# Patient Record
Sex: FEMALE | Race: BLACK OR AFRICAN AMERICAN | NOT HISPANIC OR LATINO | Employment: FULL TIME | URBAN - METROPOLITAN AREA
[De-identification: names, ages, dates, MRNs, and addresses within clinical notes are randomized per-mention and may not be internally consistent; named-entity substitution may affect disease eponyms.]

---

## 2017-11-10 ENCOUNTER — HOSPITAL ENCOUNTER (OUTPATIENT)
Dept: LAB | Facility: HOSPITAL | Age: 22
Discharge: HOME | End: 2017-11-10
Attending: PHYSICIAN ASSISTANT | Admitting: PHYSICIAN ASSISTANT

## 2017-11-10 LAB
ALBUMIN SERPL-MCNC: 4 G/DL (ref 3.5–4.8)
ALBUMIN/GLOB SERPL: 1.4 {RATIO} (ref 1–1.7)
ALP SERPL-CCNC: 77 IU/L (ref 32–91)
ALT SERPL-CCNC: 24 IU/L (ref 14–54)
ANION GAP SERPL CALC-SCNC: 10 MMOL/L (ref 10–20)
AST SERPL-CCNC: 20 IU/L (ref 15–41)
BILIRUB SERPL-MCNC: 0.4 MG/DL (ref 0.3–1.2)
BUN SERPL-MCNC: 11 MG/DL (ref 8–20)
BUN/CREAT SERPL: 13.8 (ref 5.4–26.2)
CALCIUM SERPL-MCNC: 9.2 MG/DL (ref 8.9–10.3)
CHLORIDE SERPL-SCNC: 105 MMOL/L (ref 101–111)
CHOLEST SERPL-MCNC: 147 MG/DL
CHOLEST/HDLC SERPL: 3.9 {RATIO}
CONV CO2: 24 MMOL/L (ref 22–32)
CONV LDL CHOLESTEROL DIRECT: 103 MG/DL (ref 0–100)
CONV TOTAL PROTEIN: 6.8 G/DL (ref 6.1–7.9)
CREAT UR-MCNC: 0.8 MG/DL (ref 0.4–1)
FSH SERPL-ACNC: NORMAL MIU/ML
GLOBULIN UR ELPH-MCNC: 2.8 G/DL (ref 2.5–3.8)
GLUCOSE SERPL-MCNC: 98 MG/DL (ref 65–99)
HDLC SERPL-MCNC: 38 MG/DL
INSULIN SERPL-ACNC: 19.5 UIU/ML (ref 2–23)
LDLC/HDLC SERPL: 2.7 {RATIO}
LH SERPL-ACNC: NORMAL MIU/ML
LIPID INTERPRETATION: ABNORMAL
POTASSIUM SERPL-SCNC: 4 MMOL/L (ref 3.6–5.1)
SODIUM SERPL-SCNC: 135 MMOL/L (ref 136–144)
TRIGL SERPL-MCNC: 93 MG/DL
TSH SERPL-ACNC: 1.94 UIU/ML (ref 0.34–5.6)
VLDLC SERPL CALC-MCNC: 6.1 MG/DL

## 2018-09-28 ENCOUNTER — HOSPITAL ENCOUNTER (OUTPATIENT)
Dept: LAB | Facility: HOSPITAL | Age: 23
Discharge: HOME | End: 2018-09-28
Attending: OBSTETRICS & GYNECOLOGY | Admitting: OBSTETRICS & GYNECOLOGY

## 2020-02-29 ENCOUNTER — HOSPITAL ENCOUNTER (INPATIENT)
Facility: HOSPITAL | Age: 25
LOS: 1 days | Discharge: HOME | End: 2020-03-03
Attending: EMERGENCY MEDICINE | Admitting: INTERNAL MEDICINE

## 2020-02-29 DIAGNOSIS — D72.829 LEUKOCYTOSIS, UNSPECIFIED TYPE: ICD-10-CM

## 2020-02-29 DIAGNOSIS — K56.7 ILEUS (HCC): ICD-10-CM

## 2020-02-29 DIAGNOSIS — R10.9 ABDOMINAL PAIN, UNSPECIFIED ABDOMINAL LOCATION: Primary | ICD-10-CM

## 2020-02-29 PROBLEM — A41.9 SEPSIS WITHOUT ACUTE ORGAN DYSFUNCTION (HCC): Status: ACTIVE | Noted: 2020-02-29

## 2020-02-29 LAB
AMPHET+METHAMPHET UR QL: POSITIVE
ANION GAP SERPL CALCULATED.3IONS-SCNC: 15 MMOL/L (ref 5–15)
BARBITURATES UR QL SCN: NEGATIVE
BENZODIAZ UR QL SCN: NEGATIVE
BUN BLD-MCNC: 10 MG/DL (ref 6–20)
BUN/CREAT SERPL: 12.7 (ref 7–25)
CALCIUM SPEC-SCNC: 9.7 MG/DL (ref 8.6–10.5)
CANNABINOIDS SERPL QL: POSITIVE
CHLORIDE SERPL-SCNC: 96 MMOL/L (ref 98–107)
CO2 SERPL-SCNC: 23 MMOL/L (ref 22–29)
COCAINE UR QL: NEGATIVE
CREAT BLD-MCNC: 0.79 MG/DL (ref 0.57–1)
D-LACTATE SERPL-SCNC: 0.9 MMOL/L (ref 0.5–2)
DEPRECATED RDW RBC AUTO: 42.4 FL (ref 37–54)
ERYTHROCYTE [DISTWIDTH] IN BLOOD BY AUTOMATED COUNT: 13.1 % (ref 12.3–15.4)
GFR SERPL CREATININE-BSD FRML MDRD: 110 ML/MIN/1.73
GIANT PLATELETS: ABNORMAL
GLUCOSE BLD-MCNC: 114 MG/DL (ref 65–99)
HCG SERPL QL: NEGATIVE
HCT VFR BLD AUTO: 40 % (ref 34–46.6)
HGB BLD-MCNC: 13.4 G/DL (ref 12–15.9)
LYMPHOCYTES # BLD MANUAL: 0.87 10*3/MM3 (ref 0.7–3.1)
LYMPHOCYTES NFR BLD MANUAL: 2 % (ref 5–12)
LYMPHOCYTES NFR BLD MANUAL: 3 % (ref 19.6–45.3)
MCH RBC QN AUTO: 30.9 PG (ref 26.6–33)
MCHC RBC AUTO-ENTMCNC: 33.5 G/DL (ref 31.5–35.7)
MCV RBC AUTO: 92.2 FL (ref 79–97)
METAMYELOCYTES NFR BLD MANUAL: 3 % (ref 0–0)
METHADONE UR QL SCN: NEGATIVE
MONOCYTES # BLD AUTO: 0.58 10*3/MM3 (ref 0.1–0.9)
MYELOCYTES NFR BLD MANUAL: 1 % (ref 0–0)
NEUTROPHILS # BLD AUTO: 26.3 10*3/MM3 (ref 1.7–7)
NEUTROPHILS NFR BLD MANUAL: 65 % (ref 42.7–76)
NEUTS BAND NFR BLD MANUAL: 26 % (ref 0–5)
NEUTS VAC BLD QL SMEAR: ABNORMAL
OPIATES UR QL: POSITIVE
OXYCODONE UR QL SCN: NEGATIVE
PLATELET # BLD AUTO: 333 10*3/MM3 (ref 140–450)
PMV BLD AUTO: 8.1 FL (ref 6–12)
POTASSIUM BLD-SCNC: 4 MMOL/L (ref 3.5–5.2)
RBC # BLD AUTO: 4.34 10*6/MM3 (ref 3.77–5.28)
RBC MORPH BLD: NORMAL
SCAN SLIDE: NORMAL
SODIUM BLD-SCNC: 134 MMOL/L (ref 136–145)
TOXIC GRANULATION: ABNORMAL
WBC NRBC COR # BLD: 28.9 10*3/MM3 (ref 3.4–10.8)

## 2020-02-29 PROCEDURE — 83605 ASSAY OF LACTIC ACID: CPT

## 2020-02-29 PROCEDURE — 85025 COMPLETE CBC W/AUTO DIFF WBC: CPT | Performed by: EMERGENCY MEDICINE

## 2020-02-29 PROCEDURE — G0378 HOSPITAL OBSERVATION PER HR: HCPCS

## 2020-02-29 PROCEDURE — 80307 DRUG TEST PRSMV CHEM ANLYZR: CPT | Performed by: NURSE PRACTITIONER

## 2020-02-29 PROCEDURE — 84703 CHORIONIC GONADOTROPIN ASSAY: CPT | Performed by: NURSE PRACTITIONER

## 2020-02-29 PROCEDURE — 85007 BL SMEAR W/DIFF WBC COUNT: CPT | Performed by: EMERGENCY MEDICINE

## 2020-02-29 PROCEDURE — 80048 BASIC METABOLIC PNL TOTAL CA: CPT | Performed by: EMERGENCY MEDICINE

## 2020-02-29 PROCEDURE — 25010000002 KETOROLAC TROMETHAMINE PER 15 MG: Performed by: NURSE PRACTITIONER

## 2020-02-29 PROCEDURE — 99284 EMERGENCY DEPT VISIT MOD MDM: CPT

## 2020-02-29 PROCEDURE — 87040 BLOOD CULTURE FOR BACTERIA: CPT | Performed by: EMERGENCY MEDICINE

## 2020-02-29 PROCEDURE — 25010000002 CEFEPIME PER 500 MG: Performed by: EMERGENCY MEDICINE

## 2020-02-29 PROCEDURE — 25010000002 ENOXAPARIN PER 10 MG: Performed by: NURSE PRACTITIONER

## 2020-02-29 PROCEDURE — 25010000002 HYDROMORPHONE PER 4 MG: Performed by: EMERGENCY MEDICINE

## 2020-02-29 PROCEDURE — 99222 1ST HOSP IP/OBS MODERATE 55: CPT | Performed by: NURSE PRACTITIONER

## 2020-02-29 PROCEDURE — 25010000002 ONDANSETRON PER 1 MG: Performed by: EMERGENCY MEDICINE

## 2020-02-29 RX ORDER — SODIUM CHLORIDE 0.9 % (FLUSH) 0.9 %
10 SYRINGE (ML) INJECTION AS NEEDED
Status: DISCONTINUED | OUTPATIENT
Start: 2020-02-29 | End: 2020-03-03 | Stop reason: HOSPADM

## 2020-02-29 RX ORDER — TRAZODONE HYDROCHLORIDE 100 MG/1
50 TABLET ORAL NIGHTLY
COMMUNITY

## 2020-02-29 RX ORDER — PRAZOSIN HYDROCHLORIDE 1 MG/1
1 CAPSULE ORAL NIGHTLY
COMMUNITY

## 2020-02-29 RX ORDER — ACETAMINOPHEN 325 MG/1
650 TABLET ORAL EVERY 6 HOURS PRN
Status: DISCONTINUED | OUTPATIENT
Start: 2020-02-29 | End: 2020-03-03 | Stop reason: HOSPADM

## 2020-02-29 RX ORDER — HYDROCODONE BITARTRATE AND ACETAMINOPHEN 7.5; 325 MG/1; MG/1
1 TABLET ORAL EVERY 6 HOURS PRN
Status: DISCONTINUED | OUTPATIENT
Start: 2020-02-29 | End: 2020-03-02

## 2020-02-29 RX ORDER — HYDROMORPHONE HCL 110MG/55ML
1 PATIENT CONTROLLED ANALGESIA SYRINGE INTRAVENOUS ONCE
Status: COMPLETED | OUTPATIENT
Start: 2020-02-29 | End: 2020-02-29

## 2020-02-29 RX ORDER — KETOROLAC TROMETHAMINE 15 MG/ML
15 INJECTION, SOLUTION INTRAMUSCULAR; INTRAVENOUS EVERY 6 HOURS PRN
Status: DISCONTINUED | OUTPATIENT
Start: 2020-02-29 | End: 2020-03-03 | Stop reason: HOSPADM

## 2020-02-29 RX ORDER — ONDANSETRON 2 MG/ML
4 INJECTION INTRAMUSCULAR; INTRAVENOUS EVERY 6 HOURS PRN
Status: DISCONTINUED | OUTPATIENT
Start: 2020-02-29 | End: 2020-03-03 | Stop reason: HOSPADM

## 2020-02-29 RX ORDER — ONDANSETRON 2 MG/ML
4 INJECTION INTRAMUSCULAR; INTRAVENOUS ONCE
Status: COMPLETED | OUTPATIENT
Start: 2020-02-29 | End: 2020-02-29

## 2020-02-29 RX ORDER — SERTRALINE HYDROCHLORIDE 100 MG/1
100 TABLET, FILM COATED ORAL DAILY
COMMUNITY

## 2020-02-29 RX ORDER — SODIUM CHLORIDE 0.9 % (FLUSH) 0.9 %
10 SYRINGE (ML) INJECTION EVERY 12 HOURS SCHEDULED
Status: DISCONTINUED | OUTPATIENT
Start: 2020-02-29 | End: 2020-03-03 | Stop reason: HOSPADM

## 2020-02-29 RX ADMIN — HYDROCODONE BITARTRATE AND ACETAMINOPHEN 1 TABLET: 7.5; 325 TABLET ORAL at 22:39

## 2020-02-29 RX ADMIN — ENOXAPARIN SODIUM 40 MG: 40 INJECTION SUBCUTANEOUS at 21:06

## 2020-02-29 RX ADMIN — METRONIDAZOLE 500 MG: 500 INJECTION, SOLUTION INTRAVENOUS at 17:43

## 2020-02-29 RX ADMIN — KETOROLAC TROMETHAMINE 15 MG: 15 INJECTION, SOLUTION INTRAMUSCULAR; INTRAVENOUS at 22:40

## 2020-02-29 RX ADMIN — HYDROMORPHONE HYDROCHLORIDE 1 MG: 2 INJECTION, SOLUTION INTRAMUSCULAR; INTRAVENOUS; SUBCUTANEOUS at 16:31

## 2020-02-29 RX ADMIN — SODIUM CHLORIDE 1000 ML: 900 INJECTION, SOLUTION INTRAVENOUS at 21:07

## 2020-02-29 RX ADMIN — ONDANSETRON 4 MG: 2 INJECTION INTRAMUSCULAR; INTRAVENOUS at 16:31

## 2020-02-29 RX ADMIN — CEFEPIME HYDROCHLORIDE 2 G: 2 INJECTION, POWDER, FOR SOLUTION INTRAVENOUS at 17:43

## 2020-02-29 RX ADMIN — Medication 10 ML: at 21:07

## 2020-02-29 RX ADMIN — SODIUM CHLORIDE 1000 ML: 900 INJECTION, SOLUTION INTRAVENOUS at 16:31

## 2020-03-01 ENCOUNTER — INPATIENT HOSPITAL (OUTPATIENT)
Dept: URBAN - METROPOLITAN AREA HOSPITAL 84 | Facility: HOSPITAL | Age: 25
End: 2020-03-01
Payer: MEDICAID

## 2020-03-01 ENCOUNTER — APPOINTMENT (OUTPATIENT)
Dept: GENERAL RADIOLOGY | Facility: HOSPITAL | Age: 25
End: 2020-03-01

## 2020-03-01 ENCOUNTER — APPOINTMENT (OUTPATIENT)
Dept: CT IMAGING | Facility: HOSPITAL | Age: 25
End: 2020-03-01

## 2020-03-01 DIAGNOSIS — F15.20 OTHER STIMULANT DEPENDENCE, UNCOMPLICATED: ICD-10-CM

## 2020-03-01 DIAGNOSIS — R93.3 ABNORMAL FINDINGS ON DIAGNOSTIC IMAGING OF OTHER PARTS OF DI: ICD-10-CM

## 2020-03-01 DIAGNOSIS — R10.84 GENERALIZED ABDOMINAL PAIN: ICD-10-CM

## 2020-03-01 DIAGNOSIS — D72.829 ELEVATED WHITE BLOOD CELL COUNT, UNSPECIFIED: ICD-10-CM

## 2020-03-01 DIAGNOSIS — R50.9 FEVER, UNSPECIFIED: ICD-10-CM

## 2020-03-01 PROBLEM — K56.7 ILEUS: Status: ACTIVE | Noted: 2020-03-01

## 2020-03-01 PROBLEM — F19.10 SUBSTANCE ABUSE: Status: ACTIVE | Noted: 2020-03-01

## 2020-03-01 LAB
ALBUMIN SERPL-MCNC: 3 G/DL (ref 3.5–5.2)
ALP SERPL-CCNC: 77 U/L (ref 39–117)
ALT SERPL W P-5'-P-CCNC: 10 U/L (ref 1–33)
ANION GAP SERPL CALCULATED.3IONS-SCNC: 11 MMOL/L (ref 5–15)
AST SERPL-CCNC: 11 U/L (ref 1–32)
BASOPHILS # BLD AUTO: 0 10*3/MM3 (ref 0–0.2)
BASOPHILS NFR BLD AUTO: 0.1 % (ref 0–1.5)
BILIRUB CONJ SERPL-MCNC: <0.2 MG/DL (ref 0.2–0.3)
BILIRUB INDIRECT SERPL-MCNC: ABNORMAL MG/DL
BILIRUB SERPL-MCNC: 0.3 MG/DL (ref 0.2–1.2)
BUN BLD-MCNC: 12 MG/DL (ref 6–20)
BUN/CREAT SERPL: 16.2 (ref 7–25)
CALCIUM SPEC-SCNC: 8.9 MG/DL (ref 8.6–10.5)
CHLORIDE SERPL-SCNC: 100 MMOL/L (ref 98–107)
CO2 SERPL-SCNC: 21 MMOL/L (ref 22–29)
CREAT BLD-MCNC: 0.74 MG/DL (ref 0.57–1)
D-LACTATE SERPL-SCNC: 1 MMOL/L (ref 0.5–2)
DEPRECATED RDW RBC AUTO: 42.4 FL (ref 37–54)
EOSINOPHIL # BLD AUTO: 0.1 10*3/MM3 (ref 0–0.4)
EOSINOPHIL NFR BLD AUTO: 0.5 % (ref 0.3–6.2)
ERYTHROCYTE [DISTWIDTH] IN BLOOD BY AUTOMATED COUNT: 12.9 % (ref 12.3–15.4)
FLUAV SUBTYP SPEC NAA+PROBE: NOT DETECTED
FLUBV RNA ISLT QL NAA+PROBE: NOT DETECTED
GFR SERPL CREATININE-BSD FRML MDRD: 119 ML/MIN/1.73
GLUCOSE BLD-MCNC: 98 MG/DL (ref 65–99)
HCT VFR BLD AUTO: 33.7 % (ref 34–46.6)
HGB BLD-MCNC: 11 G/DL (ref 12–15.9)
LIPASE SERPL-CCNC: 6 U/L (ref 13–60)
LYMPHOCYTES # BLD AUTO: 1.1 10*3/MM3 (ref 0.7–3.1)
LYMPHOCYTES NFR BLD AUTO: 5.5 % (ref 19.6–45.3)
MCH RBC QN AUTO: 30.2 PG (ref 26.6–33)
MCHC RBC AUTO-ENTMCNC: 32.7 G/DL (ref 31.5–35.7)
MCV RBC AUTO: 92.5 FL (ref 79–97)
MONOCYTES # BLD AUTO: 0.7 10*3/MM3 (ref 0.1–0.9)
MONOCYTES NFR BLD AUTO: 3.2 % (ref 5–12)
NEUTROPHILS # BLD AUTO: 18.9 10*3/MM3 (ref 1.7–7)
NEUTROPHILS NFR BLD AUTO: 90.7 % (ref 42.7–76)
NRBC BLD AUTO-RTO: 0 /100 WBC (ref 0–0.2)
PLATELET # BLD AUTO: 265 10*3/MM3 (ref 140–450)
PMV BLD AUTO: 8.2 FL (ref 6–12)
POTASSIUM BLD-SCNC: 3.6 MMOL/L (ref 3.5–5.2)
PROT SERPL-MCNC: 6.5 G/DL (ref 6–8.5)
RBC # BLD AUTO: 3.64 10*6/MM3 (ref 3.77–5.28)
SODIUM BLD-SCNC: 132 MMOL/L (ref 136–145)
WBC NRBC COR # BLD: 20.9 10*3/MM3 (ref 3.4–10.8)

## 2020-03-01 PROCEDURE — 71045 X-RAY EXAM CHEST 1 VIEW: CPT

## 2020-03-01 PROCEDURE — G0378 HOSPITAL OBSERVATION PER HR: HCPCS

## 2020-03-01 PROCEDURE — 25010000002 KETOROLAC TROMETHAMINE PER 15 MG: Performed by: NURSE PRACTITIONER

## 2020-03-01 PROCEDURE — 87491 CHLMYD TRACH DNA AMP PROBE: CPT | Performed by: OBSTETRICS & GYNECOLOGY

## 2020-03-01 PROCEDURE — 99233 SBSQ HOSP IP/OBS HIGH 50: CPT | Performed by: INTERNAL MEDICINE

## 2020-03-01 PROCEDURE — 87591 N.GONORRHOEAE DNA AMP PROB: CPT | Performed by: OBSTETRICS & GYNECOLOGY

## 2020-03-01 PROCEDURE — 83690 ASSAY OF LIPASE: CPT | Performed by: INTERNAL MEDICINE

## 2020-03-01 PROCEDURE — 93005 ELECTROCARDIOGRAM TRACING: CPT | Performed by: NURSE PRACTITIONER

## 2020-03-01 PROCEDURE — 80048 BASIC METABOLIC PNL TOTAL CA: CPT | Performed by: NURSE PRACTITIONER

## 2020-03-01 PROCEDURE — 99204 OFFICE O/P NEW MOD 45 MIN: CPT | Performed by: SURGERY

## 2020-03-01 PROCEDURE — 74177 CT ABD & PELVIS W/CONTRAST: CPT

## 2020-03-01 PROCEDURE — 85025 COMPLETE CBC W/AUTO DIFF WBC: CPT | Performed by: NURSE PRACTITIONER

## 2020-03-01 PROCEDURE — 80076 HEPATIC FUNCTION PANEL: CPT | Performed by: INTERNAL MEDICINE

## 2020-03-01 PROCEDURE — 74018 RADEX ABDOMEN 1 VIEW: CPT

## 2020-03-01 PROCEDURE — 84702 CHORIONIC GONADOTROPIN TEST: CPT | Performed by: INTERNAL MEDICINE

## 2020-03-01 PROCEDURE — 83605 ASSAY OF LACTIC ACID: CPT | Performed by: NURSE PRACTITIONER

## 2020-03-01 PROCEDURE — 99222 1ST HOSP IP/OBS MODERATE 55: CPT | Performed by: NURSE PRACTITIONER

## 2020-03-01 PROCEDURE — 25010000002 PIPERACILLIN SOD-TAZOBACTAM PER 1 G: Performed by: INTERNAL MEDICINE

## 2020-03-01 PROCEDURE — 0 IOPAMIDOL PER 1 ML: Performed by: INTERNAL MEDICINE

## 2020-03-01 PROCEDURE — 87502 INFLUENZA DNA AMP PROBE: CPT | Performed by: INTERNAL MEDICINE

## 2020-03-01 RX ORDER — SODIUM CHLORIDE 9 MG/ML
100 INJECTION, SOLUTION INTRAVENOUS CONTINUOUS
Status: DISCONTINUED | OUTPATIENT
Start: 2020-03-01 | End: 2020-03-03 | Stop reason: HOSPADM

## 2020-03-01 RX ORDER — NICOTINE 21 MG/24HR
1 PATCH, TRANSDERMAL 24 HOURS TRANSDERMAL
Status: DISCONTINUED | OUTPATIENT
Start: 2020-03-01 | End: 2020-03-03 | Stop reason: HOSPADM

## 2020-03-01 RX ORDER — KETOROLAC TROMETHAMINE 30 MG/ML
30 INJECTION, SOLUTION INTRAMUSCULAR; INTRAVENOUS ONCE
Status: DISCONTINUED | OUTPATIENT
Start: 2020-03-01 | End: 2020-03-03 | Stop reason: HOSPADM

## 2020-03-01 RX ORDER — PANTOPRAZOLE SODIUM 40 MG/10ML
40 INJECTION, POWDER, LYOPHILIZED, FOR SOLUTION INTRAVENOUS EVERY 12 HOURS SCHEDULED
Status: DISCONTINUED | OUTPATIENT
Start: 2020-03-01 | End: 2020-03-03 | Stop reason: HOSPADM

## 2020-03-01 RX ADMIN — SODIUM CHLORIDE 100 ML/HR: 900 INJECTION, SOLUTION INTRAVENOUS at 14:54

## 2020-03-01 RX ADMIN — HYDROCODONE BITARTRATE AND ACETAMINOPHEN 1 TABLET: 7.5; 325 TABLET ORAL at 23:22

## 2020-03-01 RX ADMIN — HYDROCODONE BITARTRATE AND ACETAMINOPHEN 1 TABLET: 7.5; 325 TABLET ORAL at 05:35

## 2020-03-01 RX ADMIN — KETOROLAC TROMETHAMINE 15 MG: 15 INJECTION, SOLUTION INTRAMUSCULAR; INTRAVENOUS at 13:02

## 2020-03-01 RX ADMIN — HYDROCODONE BITARTRATE AND ACETAMINOPHEN 1 TABLET: 7.5; 325 TABLET ORAL at 11:41

## 2020-03-01 RX ADMIN — PANTOPRAZOLE SODIUM 40 MG: 40 INJECTION, POWDER, FOR SOLUTION INTRAVENOUS at 14:57

## 2020-03-01 RX ADMIN — KETOROLAC TROMETHAMINE 15 MG: 15 INJECTION, SOLUTION INTRAMUSCULAR; INTRAVENOUS at 22:11

## 2020-03-01 RX ADMIN — IOPAMIDOL 100 ML: 755 INJECTION, SOLUTION INTRAVENOUS at 13:30

## 2020-03-01 RX ADMIN — PIPERACILLIN AND TAZOBACTAM 3.38 G: 3; .375 INJECTION, POWDER, FOR SOLUTION INTRAVENOUS at 20:28

## 2020-03-01 RX ADMIN — Medication 10 ML: at 20:41

## 2020-03-01 RX ADMIN — PANTOPRAZOLE SODIUM 40 MG: 40 INJECTION, POWDER, FOR SOLUTION INTRAVENOUS at 20:28

## 2020-03-01 RX ADMIN — SODIUM CHLORIDE 100 ML/HR: 900 INJECTION, SOLUTION INTRAVENOUS at 04:11

## 2020-03-01 RX ADMIN — HYDROCODONE BITARTRATE AND ACETAMINOPHEN 1 TABLET: 7.5; 325 TABLET ORAL at 17:20

## 2020-03-01 RX ADMIN — PIPERACILLIN SODIUM,TAZOBACTAM SODIUM 3.38 G: 3; .375 INJECTION, POWDER, FOR SOLUTION INTRAVENOUS at 14:55

## 2020-03-01 RX ADMIN — KETOROLAC TROMETHAMINE 15 MG: 15 INJECTION, SOLUTION INTRAMUSCULAR; INTRAVENOUS at 05:35

## 2020-03-01 NOTE — PROGRESS NOTES
[facility] Medicine Services Daily Progress Note      Hospitalist Team  LOS 0 days      Patient Care Team:  Asia Covarrubias MD as PCP - General    Patient Location: 13/1      Subjective   Subjective     Chief Complaint / Subjective  Chief Complaint   Patient presents with   • Abdominal Pain         Brief Synopsis of Hospital Course/HPI          24-year-old female presents the ER with chief complaint of severe bilateral lower quadrant abdominal pain which started 3 days ago.  Patient reports associated subjective fever and chills.  She was seen at [facility] ER for CT of abdomen showing mild ileus treated and released.  Today the abdominal pain came back and the patient decided to come in for further evaluation.  He patient is noted to be mildly tachycardic at time of interview.  Patient denies personal or family history of IBS or IBD.  The patient also reports bilateral lower extremity pain and twitching.the patient reports she last used methamphetamines 3 days ago.  She states she quit using methamphetamines because she was just quitting.     Urine drug screen positive for methamphetamine, opiates and THC.  The patient admits to doing methamphetamine and marijuana over the last 5 to 7 years.  She denies opioid use.  She reports she has never done IV drugs not even once in the past.     There was some confusion and the patient was showing up under pregnancy diagnosis.  Review of records from [facility] show that the patient's urine hCG was negative.  Urine qualitative blood sample taken for pregnancy which was negative at this facility.     Review of records from prior facility show probable ileus.            Date::    3./1  Severe abdominal pain.  Patient crying  Severe tenderness on exam  Discussed with Dr. Hammond regarding further work-up plans CT abdomen pelvis is recommended.  Keep n.p.o.  Pain medications addressed  Nursing staff concerned about  "patient previously told she had positive pregnancy test.  Repeat hCG ordered.  Consult GI and OB/GYN  Check lipase and LFT        ROS    All other systems reviewed and neg      Objective   Objective      Vital Signs  Temp:  [98.3 °F (36.8 °C)-100.3 °F (37.9 °C)] 99.2 °F (37.3 °C)  Heart Rate:  [] 107  Resp:  [18-36] 28  BP: (104-159)/(56-81) 109/74  Oxygen Therapy  SpO2: 98 %  Pulse Oximetry Type: Intermittent  Device (Oxygen Therapy): room air  Flowsheet Rows      First Filed Value   Admission Height  157.5 cm (62\") Documented at 02/29/2020 1453   Admission Weight  77.1 kg (170 lb) Documented at 02/29/2020 1453        Intake & Output (last 3 days)       02/27 0701 - 02/28 0700 02/28 0701 - 02/29 0700 02/29 0701 - 03/01 0700 03/01 0701 - 03/02 0700    Urine (mL/kg/hr)   100     Total Output   100     Net   -100                 Lines, Drains & Airways    Active LDAs     Name:   Placement date:   Placement time:   Site:   Days:    Peripheral IV 02/29/20 1629 Right Antecubital   02/29/20    1629    Antecubital   less than 1                  Physical Exam:    Physical Exam   Constitutional: She is oriented to person, place, and time.   Crying in pain   HENT:   Head: Normocephalic and atraumatic.   Eyes: Conjunctivae and lids are normal.   Neck: Trachea normal. No thyroid mass present.   Cardiovascular: Normal heart sounds.   Pulmonary/Chest: She has rhonchi in the right middle field, the right lower field, the left middle field and the left lower field.   Abdominal:   Diffuse severe tenderness   Genitourinary:   Genitourinary Comments: Deferred   Neurological: She is alert and oriented to person, place, and time. She has normal strength. No cranial nerve deficit.             Procedures:              Results Review:     I reviewed the patient's new clinical results.      Lab Results (last 24 hours)     Procedure Component Value Units Date/Time    Hepatic Function Panel [[id number]]  (Abnormal) Collected:  03/01/20 " 0530    Specimen:  Blood Updated:  03/01/20 1151     Total Protein 6.5 g/dL      Albumin 3.00 g/dL      ALT (SGPT) 10 U/L      AST (SGOT) 11 U/L      Alkaline Phosphatase 77 U/L      Total Bilirubin 0.3 mg/dL      Bilirubin, Direct <0.2 mg/dL      Bilirubin, Indirect --     Comment: Unable to calculate       Lipase [[id number]]  (Abnormal) Collected:  03/01/20 0530    Specimen:  Blood Updated:  03/01/20 1151     Lipase 6 U/L     Basic Metabolic Panel [[id number]]  (Abnormal) Collected:  03/01/20 0530    Specimen:  Blood Updated:  03/01/20 0626     Glucose 98 mg/dL      BUN 12 mg/dL      Creatinine 0.74 mg/dL      Sodium 132 mmol/L      Potassium 3.6 mmol/L      Chloride 100 mmol/L      CO2 21.0 mmol/L      Calcium 8.9 mg/dL      eGFR  African Amer 119 mL/min/1.73      BUN/Creatinine Ratio 16.2     Anion Gap 11.0 mmol/L     Narrative:       GFR Normal >60  Chronic Kidney Disease <60  Kidney Failure <15      CBC Auto Differential [[id number]]  (Abnormal) Collected:  03/01/20 0530    Specimen:  Blood Updated:  03/01/20 0607     WBC 20.90 10*3/mm3      RBC 3.64 10*6/mm3      Hemoglobin 11.0 g/dL      Comment: Result checked         Hematocrit 33.7 %      MCV 92.5 fL      MCH 30.2 pg      MCHC 32.7 g/dL      RDW 12.9 %      RDW-SD 42.4 fl      MPV 8.2 fL      Platelets 265 10*3/mm3      Neutrophil % 90.7 %      Lymphocyte % 5.5 %      Monocyte % 3.2 %      Eosinophil % 0.5 %      Basophil % 0.1 %      Neutrophils, Absolute 18.90 10*3/mm3      Lymphocytes, Absolute 1.10 10*3/mm3      Monocytes, Absolute 0.70 10*3/mm3      Eosinophils, Absolute 0.10 10*3/mm3      Basophils, Absolute 0.00 10*3/mm3      nRBC 0.0 /100 WBC     Lactic Acid, Plasma [[id number]]  (Normal) Collected:  03/01/20 0530    Specimen:  Blood Updated:  03/01/20 0604     Lactate 1.0 mmol/L     Blood Culture - Blood, Arm, Left [[id number]] Collected:  02/29/20 1710    Specimen:  Blood from Arm, Left Updated:  03/01/20 0557     Blood Culture No growth at less  than 24 hours    Blood Culture - Blood, Arm, Right [[id number]] Collected:  02/29/20 1710    Specimen:  Blood from Arm, Right Updated:  03/01/20 0515     Blood Culture No growth at less than 24 hours    Urine Drug Screen - Urine, Clean Catch [[id number]]  (Abnormal) Collected:  02/29/20 2027    Specimen:  Urine, Clean Catch Updated:  02/29/20 2103     Amphet/Methamphet, Screen Positive     Barbiturates Screen, Urine Negative     Benzodiazepine Screen, Urine Negative     Cocaine Screen, Urine Negative     Opiate Screen Positive     THC, Screen, Urine Positive     Methadone Screen, Urine Negative     Oxycodone Screen, Urine Negative    Narrative:       Negative Thresholds For Drugs Screened:     Amphetamines               500 ng/ml   Barbiturates               200 ng/ml   Benzodiazepines            100 ng/ml   Cocaine                    300 ng/ml   Methadone                  300 ng/ml   Opiates                    300 ng/ml   Oxycodone                  100 ng/ml   THC                        50 ng/ml    The Normal Value for all drugs tested is negative. This report includes final unconfirmed screening results to be used for medical treatment purposes only. Unconfirmed results must not be used for non-medical purposes such as employment or legal testing. Clinical consideration should be applied to any drug of abuse test, particulary when unconfirmed results are used.  All urine drugs of abuse requests without chain of custody are for medical screening purposes only.  False positives are possible.      hCG, Serum, Qualitative [[id number]]  (Normal) Collected:  02/29/20 2013    Specimen:  Blood Updated:  02/29/20 2040     HCG Qualitative Negative    POC Lactate [[id number]]  (Normal) Collected:  02/29/20 1714    Specimen:  Blood Updated:  02/29/20 1715     Lactate 0.9 mmol/L      Comment: Serial Number: [id number]:  [id number]       CBC & Differential [[id number]] Collected:  02/29/20 1629    Specimen:  Blood Updated:   02/29/20 1704    Narrative:       The following orders were created for panel order CBC & Differential.  Procedure                               Abnormality         Status                     ---------                               -----------         ------                     CBC Auto Differential[[id number]]        Abnormal            Final result                 Please view results for these tests on the individual orders.    CBC Auto Differential [[id number]]  (Abnormal) Collected:  02/29/20 1629    Specimen:  Blood Updated:  02/29/20 1704     WBC 28.90 10*3/mm3      RBC 4.34 10*6/mm3      Hemoglobin 13.4 g/dL      Hematocrit 40.0 %      MCV 92.2 fL      MCH 30.9 pg      MCHC 33.5 g/dL      RDW 13.1 %      RDW-SD 42.4 fl      MPV 8.1 fL      Platelets 333 10*3/mm3     Scan Slide [[id number]] Collected:  02/29/20 1629    Specimen:  Blood Updated:  02/29/20 1704     Scan Slide --     Comment: See Manual Differential Results       Manual Differential [[id number]]  (Abnormal) Collected:  02/29/20 1629    Specimen:  Blood Updated:  02/29/20 1704     Neutrophil % 65.0 %      Lymphocyte % 3.0 %      Monocyte % 2.0 %      Bands %  26.0 %      Metamyelocyte % 3.0 %      Myelocyte % 1.0 %      Neutrophils Absolute 26.30 10*3/mm3      Lymphocytes Absolute 0.87 10*3/mm3      Monocytes Absolute 0.58 10*3/mm3      RBC Morphology Normal     Toxic Granulation Slight/1+     Vacuolated Neutrophils Slight/1+     Giant Platelets Slight/1+    Basic Metabolic Panel [[id number]]  (Abnormal) Collected:  02/29/20 1629    Specimen:  Blood Updated:  02/29/20 1701     Glucose 114 mg/dL      BUN 10 mg/dL      Creatinine 0.79 mg/dL      Sodium 134 mmol/L      Potassium 4.0 mmol/L      Chloride 96 mmol/L      CO2 23.0 mmol/L      Calcium 9.7 mg/dL      eGFR  African Amer 110 mL/min/1.73      BUN/Creatinine Ratio 12.7     Anion Gap 15.0 mmol/L     Narrative:       GFR Normal >60  Chronic Kidney Disease <60  Kidney Failure <15          No results  found for: HGBA1C            Lab Results   Component Value Date    LIPASE 6 (L) 03/01/2020     Lab Results   Component Value Date    CHOL 147 11/10/2017    TRIG 93 11/10/2017    HDL 38 (L) 11/10/2017     (H) 11/10/2017       No results found for: INTRAOP, PREDX, FINALDX, COMDX    Microbiology Results (last 10 days)     Procedure Component Value - Date/Time    Blood Culture - Blood, Arm, Left [[id number]] Collected:  02/29/20 1710    Lab Status:  Preliminary result Specimen:  Blood from Arm, Left Updated:  03/01/20 0515     Blood Culture No growth at less than 24 hours    Blood Culture - Blood, Arm, Right [[id number]] Collected:  02/29/20 1710    Lab Status:  Preliminary result Specimen:  Blood from Arm, Right Updated:  03/01/20 0515     Blood Culture No growth at less than 24 hours          ECG/EMG Results (most recent)     Procedure Component Value Units Date/Time    ECG 12 Lead [[id number]] Collected:  03/01/20 0729     Updated:  03/01/20 0731    Narrative:       HEART RATE= 93  bpm  RR Interval= 648  ms  GA Interval= 136  ms  P Horizontal Axis= -6  deg  P Front Axis= 83  deg  QRSD Interval= 77  ms  QT Interval= 349  ms  QRS Axis= 56  deg  T Wave Axis= 23  deg  - NORMAL ECG -  Sinus rhythm  Electronically Signed By:   Date and Time of Study: 2020-03-01 07:29:55                    No radiology results for the last 7 days        Xrays, labs reviewed personally by physician.    Medication Review:   I have reviewed the patient's current medication list      Scheduled Meds    ketorolac 30 mg Intravenous Once   nicotine 1 patch Transdermal Q24H   pantoprazole 40 mg Intravenous Q12H   piperacillin-tazobactam 3.375 g Intravenous Once   piperacillin-tazobactam 3.375 g Intravenous Q8H   sodium chloride 10 mL Intravenous Q12H       Meds Infusions    Pharmacy to Dose Zosyn     sodium chloride 100 mL/hr Last Rate: 100 mL/hr (03/01/20 0915)       Meds PRN  •  acetaminophen  •  HYDROcodone-acetaminophen  •  ketorolac  •   ondansetron  •  Pharmacy to Dose Zosyn  •  [COMPLETED] Insert peripheral IV **AND** sodium chloride  •  sodium chloride    I personally reviewed patient's x-ray film and all other relevant data                    sAssessment/Plan        Assessment/Plan     Active Hospital Problems    Diagnosis  POA   • Ileus [K56.7]  Unknown   • Substance abuse (CMS/HCC) [F19.10]  Unknown   • Leukocytosis [D72.829]  Unknown   • Abdominal pain [R10.9]  Yes   • Sepsis without acute organ dysfunction (CMS/HCC) [A41.9]  Unknown      Resolved Hospital Problems   No resolved problems to display.       MEDICAL DECISION MAKING COMPLEXITY BY PROBLEM:          Sepsis without acute organ dysfunction--likely related to abdominal inflammation  Antibiotics with Zosyn  Follow cultures  Check urinalysis     Acute severe abdominal pain, CT abdomen pelvis from [facility] showed ileus: Analgesics and antiemetics; IV antibiotics  Repeat CT scan  Discussed with Dr. Hammond  Consult GI and OB/GYN for evaluation of other differential diagnosis occluding possibility of ectopic pregnancy versus pancreatitis versus others    Tachycardia, chest pain  Possibly Part of sepsis  Check EKG and chest x-ray        Leukocytosis, WBC 28.9--consideration for abdominal infection: Continue cefepime and IV Flagyl; repeat BMP in a.m.        Substance abuse, tobacco, methamphetamine, marijuana: Encourage smoking cessation, methamphetamine cessation, marijuana cessation; add nicotine patch        VTE Prophylaxis -SCD  Hold off on Lovenox      GI prophylaxis with Protonix    Mechanical Order History:     None      Pharmalogical Order History:     Ordered     Dose Route Frequency Stop    02/29/20 1940  enoxaparin (LOVENOX) syringe 40 mg      40 mg SC Every 24 Hours Scheduled --            Code Status -   Code Status and Medical Interventions:   Ordered at: 02/29/20 1940     Code Status:    CPR     Medical Interventions (Level of Support Prior to Arrest):    Full       Discharge  Planning          Destination      Coordination has not been started for this encounter.      Durable Medical Equipment      Coordination has not been started for this encounter.      Dialysis/Infusion      Coordination has not been started for this encounter.      Home Medical Care      Coordination has not been started for this encounter.      Therapy      Coordination has not been started for this encounter.      Community Resources      Coordination has not been started for this encounter.            Electronically signed by Dav Lou MD, 03/01/20, 11:55 AM.  [facility] Hospitalist Team

## 2020-03-01 NOTE — H&P
[facility] Medicine Services      Patient Name: Stephanie Nath  : 1995  MRN: [id number]  Primary Care Physician: Asia Covarrubias MD  Date of admission: 2020    Patient Care Team:  Asia Covarrubias MD as PCP - General          Subjective   History Present Illness     Chief Complaint:   Chief Complaint   Patient presents with   • Abdominal Pain       Ms. Nath is a 24 y.o.  presents to [facility] complaining of abdominal pain.         24-year-old female presents the ER with chief complaint of severe bilateral lower quadrant abdominal pain which started 3 days ago.  Patient reports associated subjective fever and chills.  She was seen at [facility] ER for CT of abdomen showing mild ileus treated and released.  Today the abdominal pain came back and the patient decided to come in for further evaluation.  He patient is noted to be mildly tachycardic at time of interview.  Patient denies personal or family history of IBS or IBD.  The patient also reports bilateral lower extremity pain and twitching.the patient reports she last used methamphetamines 3 days ago.  She states she quit using methamphetamines because she was just quitting.    Urine drug screen positive for methamphetamine, opiates and THC.  The patient admits to doing methamphetamine and marijuana over the last 5 to 7 years.  She denies opioid use.  She reports she has never done IV drugs not even once in the past.    There was some confusion and the patient was showing up under pregnancy diagnosis.  Review of records from [facility] show that the patient's urine hCG was negative.  Urine qualitative blood sample taken for pregnancy which was negative at this facility.    Review of records from prior facility show probable ileus.        Review of Systems   Constitution: Positive for chills and malaise/fatigue. Negative for fever.   Cardiovascular: Negative for chest pain.    Gastrointestinal: Positive for bloating, abdominal pain, anorexia and vomiting.   Genitourinary: Positive for bladder incontinence.   Neurological: Negative for weakness.   All other systems reviewed and are negative.          Personal History     Past Medical History: History reviewed. No pertinent past medical history.    Surgical History:      Past Surgical History:   Procedure Laterality Date   •  SECTION             Family History: family history is not on file. Otherwise pertinent FHx was reviewed and unremarkable.     Social History:  reports that she has been smoking cigarettes. She started smoking about 8 years ago. She has been smoking about 0.50 packs per day. She has never used smokeless tobacco. She reports that she drank alcohol. She reports that she has current or past drug history. Drugs: Methamphetamines and Marijuana.      Medications:  Prior to Admission medications    Medication Sig Start Date End Date Taking? Authorizing Provider   prazosin (MINIPRESS) 1 MG capsule Take 1 mg by mouth Every Night.    ProviderSabi MD   sertraline (ZOLOFT) 100 MG tablet Take 100 mg by mouth Daily.    ProviderSabi MD   traZODone (DESYREL) 100 MG tablet Take 50 mg by mouth Every Night.    ProviderSabi MD       Allergies:  No Known Allergies    Objective   Objective     Vital Signs  Temp:  [98.9 °F (37.2 °C)-100.3 °F (37.9 °C)] 100.3 °F (37.9 °C)  Heart Rate:  [120-125] 123  Resp:  [18-36] 28  BP: (104-159)/(56-81) 117/77  SpO2:  [96 %-100 %] 96 %  on   ;   Device (Oxygen Therapy): room air  Body mass index is 33.73 kg/m².    Physical Exam   Constitutional: She is oriented to person, place, and time. She appears well-developed and well-nourished. No distress.   HENT:   Head: Normocephalic and atraumatic.   Eyes: Pupils are equal, round, and reactive to light. Conjunctivae and EOM are normal.   Neck: Normal range of motion. Neck supple. No JVD present. No tracheal deviation present.  No thyromegaly present.   Cardiovascular: Regular rhythm, normal heart sounds and intact distal pulses.   No murmur heard.  Mild tachycardia, heart rate 110-120, regular   Pulmonary/Chest: Effort normal and breath sounds normal. No respiratory distress.   Abdominal: Soft. Bowel sounds are normal. She exhibits no distension. There is tenderness.   Musculoskeletal: Normal range of motion. She exhibits no tenderness.   Neurological: She is alert and oriented to person, place, and time.   Skin: Skin is warm and dry. Capillary refill takes less than 2 seconds. She is not diaphoretic.   Psychiatric:   The patient does not actively engage in conversation and answers question with an initial almost imperceivable head nod.  She will answer questions verbally with repeated questioning.    Vitals reviewed.      Results Review:  I have personally reviewed most recent lab results and radiology images and interpretations and agree with findings, most notably: Tachycardia, leukocytosis.    Results from last 7 days   Lab Units 02/29/20  1629   WBC 10*3/mm3 28.90*   HEMOGLOBIN g/dL 13.4   HEMATOCRIT % 40.0   PLATELETS 10*3/mm3 333     Results from last 7 days   Lab Units 02/29/20  1714 02/29/20  1629   SODIUM mmol/L  --  134*   POTASSIUM mmol/L  --  4.0   CHLORIDE mmol/L  --  96*   CO2 mmol/L  --  23.0   BUN mg/dL  --  10   CREATININE mg/dL  --  0.79   GLUCOSE mg/dL  --  114*   CALCIUM mg/dL  --  9.7   LACTATE mmol/L 0.9  --      Estimated Creatinine Clearance: 112 mL/min (by C-G formula based on SCr of 0.79 mg/dL).  Brief Urine Lab Results     None          Microbiology Results (last 10 days)     ** No results found for the last 240 hours. **          ECG/EMG Results (most recent)     None                    No radiology results for the last 7 days      Estimated Creatinine Clearance: 112 mL/min (by C-G formula based on SCr of 0.79 mg/dL).    Assessment/Plan   Assessment/Plan       Active Hospital Problems    Diagnosis  POA   •  Abdominal pain [R10.9]  Yes   • Sepsis without acute organ dysfunction (CMS/HCC) [A41.9]  Unknown      Resolved Hospital Problems   No resolved problems to display.       Sepsis without acute organ dysfunction--likely related to abdominal inflammation: IV cefepime, IV Flagyl    Abdominal pain, CT abdomen pelvis from [facility] showed ileus: Analgesics and antiemetics; IV antibiotics    Tachycardia, heart rate 110--consideration for volume depletion from sepsis; consideration for methamphetamine use    Leukocytosis, WBC 28.9--consideration for abdominal infection: Continue cefepime and IV Flagyl; repeat BMP in a.m.    Hyponatremia, mild, sodium 134, likely secondary to GI loss: IV fluids normal saline; repeat BMP    Substance abuse, tobacco, methamphetamine, marijuana: Encourage smoking cessation, methamphetamine cessation, marijuana cessation; add nicotine patch      VTE Prophylaxis -   Mechanical Order History:     None      Pharmalogical Order History:     Ordered     Dose Route Frequency Stop    02/29/20 1940  enoxaparin (LOVENOX) syringe 40 mg      40 mg SC Every 24 Hours Scheduled --          CODE STATUS:    Code Status and Medical Interventions:   Ordered at: 02/29/20 1940     Code Status:    CPR     Medical Interventions (Level of Support Prior to Arrest):    Full     Aute abdominal pain  Admission Status:  I believe this patient meets observation criteria.      I discussed the patient's findings and my recommendations with patient and nursing staff.        Electronically signed by STEFAN Nieto, 02/29/20, 10:34 PM.  [facility] Hospitalist Team

## 2020-03-01 NOTE — PLAN OF CARE
Patient uncooperative answering questions upon admission. She alternated between periods of calm with periods of screaming and crying. Patient did admit to meth use after being told she tested positive by NP and continued to ask for pain medicine. Patient received fluids and pain medicine per NP during the night. After receiving meds, she slept the rest of the night.

## 2020-03-01 NOTE — CONSULTS
GENERAL SURGERY CONSULTATION NOTE    Consult requested by: Dr. Lou    Patient Care Team:  Asia Covarrubias MD as PCP - General    Reason for consult: Abdominal pain    Subjective     Patient is a 24 y.o. female presents with lower abdominal pain, and failure to have a bowel movement or passed flatus over the last 3 days.  The patient reports that she was seen at [facility] earlier this week where she was told she was pregnant.  She then went to [facility] approximately 3 days ago again, where she was told she was pregnant and instructed to take prenatal vitamins.  Prior to presenting to these 2 hospitals she was engaging in unprotected sex with multiple partners.  She reports that 3 days ago she started to develop lower abdominal pain bilaterally which radiated up into her epigastrium and chest.  The pain was not associated with nausea, vomiting, diarrhea, but was associated with constipation.  Yesterday, her pain became so severe that she presented to [facility] where she was seen and evaluated in the emergency room.  There, her labs were notable for a negative beta hCG, elevated white blood cell count of 21,000, a CT scan of the abdomen and pelvis was performed that demonstrated ileus without other intra-abdominal findings, and a pelvic ultrasound which did not demonstrate any intrauterine pregnancy or findings of pelvic inflammatory disease.  The patient's past medical history is significant for methamphetamine use, tobacco abuse, marijuana use, and 1 live birth with 3 miscarriages.  The patient's 1 live birth was a  performed in May 2018.  She is otherwise had no intra-abdominal surgeries.  The patient presented to our emergency room yesterday, and was admitted to the hospital for further work-up and evaluation.    Review of Systems   Constitutional: Negative for appetite change, chills and fever.   HENT: Negative for congestion and sore throat.     Respiratory: Negative for cough and shortness of breath.    Cardiovascular: Negative for chest pain and palpitations.   Gastrointestinal: Positive for abdominal pain and constipation. Negative for diarrhea, nausea, vomiting and GERD.   Genitourinary: Positive for dyspareunia, pelvic pain and pelvic pressure. Negative for difficulty urinating, dysuria and frequency.   Musculoskeletal: Negative for arthralgias and back pain.   Skin: Negative for rash and skin lesions.   Neurological: Negative for dizziness, seizures and memory problem.   Hematological: Negative for adenopathy. Does not bruise/bleed easily.   Psychiatric/Behavioral: Negative for sleep disturbance and depressed mood.        History  History reviewed. No pertinent past medical history.  Past Surgical History:   Procedure Laterality Date   •  SECTION       History reviewed. No pertinent family history.  Social History     Tobacco Use   • Smoking status: Current Every Day Smoker     Packs/day: 0.50     Types: Cigarettes     Start date: 2012   • Smokeless tobacco: Never Used   Substance Use Topics   • Alcohol use: Not Currently   • Drug use: Yes     Types: Methamphetamines, Marijuana     Comment: Patient denies heroin or hydrocodone or oxycodone use     Medications Prior to Admission   Medication Sig Dispense Refill Last Dose   • prazosin (MINIPRESS) 1 MG capsule Take 1 mg by mouth Every Night.   More than a month at Unknown time   • sertraline (ZOLOFT) 100 MG tablet Take 100 mg by mouth Daily.   More than a month at Unknown time   • traZODone (DESYREL) 100 MG tablet Take 50 mg by mouth Every Night.   More than a month at Unknown time     Allergies:  Patient has no known allergies.    Objective     Vital Signs  Temp:  [98.3 °F (36.8 °C)-100.3 °F (37.9 °C)] 99.2 °F (37.3 °C)  Heart Rate:  [] 107  Resp:  [18-36] 28  BP: (104-159)/(56-81) 109/74    Physical Exam   Constitutional: She is oriented to person, place, and time. She appears  well-developed.   Patient was resting comfortably, and awoke for questioning.  Poor eye contact during the interview.  Was in no acute distress until I began palpating her abdomen.   HENT:   Head: Normocephalic and atraumatic.   Eyes: Pupils are equal, round, and reactive to light. EOM are normal.   Neck: Normal range of motion. Neck supple.   Cardiovascular: Regular rhythm.   Slightly tachycardic   Pulmonary/Chest: Effort normal and breath sounds normal.   Abdominal: Soft. She exhibits no distension. There is generalized tenderness. No hernia.   Patient has generalized tenderness throughout the abdomen with increased tenderness in bilateral lower quadrants   Musculoskeletal: Normal range of motion. She exhibits no edema.   Neurological: She is alert and oriented to person, place, and time.   Skin: Skin is warm and dry. No erythema.   Psychiatric: She has a normal mood and affect. Her behavior is normal.   Vitals reviewed.      Results Review:   Lab Results (last 24 hours)     Procedure Component Value Units Date/Time    Hepatic Function Panel [[id number]]  (Abnormal) Collected:  03/01/20 0530    Specimen:  Blood Updated:  03/01/20 1151     Total Protein 6.5 g/dL      Albumin 3.00 g/dL      ALT (SGPT) 10 U/L      AST (SGOT) 11 U/L      Alkaline Phosphatase 77 U/L      Total Bilirubin 0.3 mg/dL      Bilirubin, Direct <0.2 mg/dL      Bilirubin, Indirect --     Comment: Unable to calculate       Lipase [[id number]]  (Abnormal) Collected:  03/01/20 0530    Specimen:  Blood Updated:  03/01/20 1151     Lipase 6 U/L     Basic Metabolic Panel [[id number]]  (Abnormal) Collected:  03/01/20 0530    Specimen:  Blood Updated:  03/01/20 0626     Glucose 98 mg/dL      BUN 12 mg/dL      Creatinine 0.74 mg/dL      Sodium 132 mmol/L      Potassium 3.6 mmol/L      Chloride 100 mmol/L      CO2 21.0 mmol/L      Calcium 8.9 mg/dL      eGFR  African Amer 119 mL/min/1.73      BUN/Creatinine Ratio 16.2     Anion Gap 11.0 mmol/L     Narrative:        GFR Normal >60  Chronic Kidney Disease <60  Kidney Failure <15      CBC Auto Differential [[id number]]  (Abnormal) Collected:  03/01/20 0530    Specimen:  Blood Updated:  03/01/20 0607     WBC 20.90 10*3/mm3      RBC 3.64 10*6/mm3      Hemoglobin 11.0 g/dL      Comment: Result checked         Hematocrit 33.7 %      MCV 92.5 fL      MCH 30.2 pg      MCHC 32.7 g/dL      RDW 12.9 %      RDW-SD 42.4 fl      MPV 8.2 fL      Platelets 265 10*3/mm3      Neutrophil % 90.7 %      Lymphocyte % 5.5 %      Monocyte % 3.2 %      Eosinophil % 0.5 %      Basophil % 0.1 %      Neutrophils, Absolute 18.90 10*3/mm3      Lymphocytes, Absolute 1.10 10*3/mm3      Monocytes, Absolute 0.70 10*3/mm3      Eosinophils, Absolute 0.10 10*3/mm3      Basophils, Absolute 0.00 10*3/mm3      nRBC 0.0 /100 WBC     Lactic Acid, Plasma [[id number]]  (Normal) Collected:  03/01/20 0530    Specimen:  Blood Updated:  03/01/20 0604     Lactate 1.0 mmol/L     Blood Culture - Blood, Arm, Left [[id number]] Collected:  02/29/20 1710    Specimen:  Blood from Arm, Left Updated:  03/01/20 0515     Blood Culture No growth at less than 24 hours    Blood Culture - Blood, Arm, Right [[id number]] Collected:  02/29/20 1710    Specimen:  Blood from Arm, Right Updated:  03/01/20 0515     Blood Culture No growth at less than 24 hours    Urine Drug Screen - Urine, Clean Catch [[id number]]  (Abnormal) Collected:  02/29/20 2027    Specimen:  Urine, Clean Catch Updated:  02/29/20 2103     Amphet/Methamphet, Screen Positive     Barbiturates Screen, Urine Negative     Benzodiazepine Screen, Urine Negative     Cocaine Screen, Urine Negative     Opiate Screen Positive     THC, Screen, Urine Positive     Methadone Screen, Urine Negative     Oxycodone Screen, Urine Negative    Narrative:       Negative Thresholds For Drugs Screened:     Amphetamines               500 ng/ml   Barbiturates               200 ng/ml   Benzodiazepines            100 ng/ml   Cocaine                    300  ng/ml   Methadone                  300 ng/ml   Opiates                    300 ng/ml   Oxycodone                  100 ng/ml   THC                        50 ng/ml    The Normal Value for all drugs tested is negative. This report includes final unconfirmed screening results to be used for medical treatment purposes only. Unconfirmed results must not be used for non-medical purposes such as employment or legal testing. Clinical consideration should be applied to any drug of abuse test, particulary when unconfirmed results are used.  All urine drugs of abuse requests without chain of custody are for medical screening purposes only.  False positives are possible.      hCG, Serum, Qualitative [[id number]]  (Normal) Collected:  02/29/20 2013    Specimen:  Blood Updated:  02/29/20 2040     HCG Qualitative Negative    POC Lactate [[id number]]  (Normal) Collected:  02/29/20 1714    Specimen:  Blood Updated:  02/29/20 1715     Lactate 0.9 mmol/L      Comment: Serial Number: [id number]:  [id number]       CBC & Differential [[id number]] Collected:  02/29/20 1629    Specimen:  Blood Updated:  02/29/20 1704    Narrative:       The following orders were created for panel order CBC & Differential.  Procedure                               Abnormality         Status                     ---------                               -----------         ------                     CBC Auto Differential[[id number]]        Abnormal            Final result                 Please view results for these tests on the individual orders.    CBC Auto Differential [[id number]]  (Abnormal) Collected:  02/29/20 1629    Specimen:  Blood Updated:  02/29/20 1704     WBC 28.90 10*3/mm3      RBC 4.34 10*6/mm3      Hemoglobin 13.4 g/dL      Hematocrit 40.0 %      MCV 92.2 fL      MCH 30.9 pg      MCHC 33.5 g/dL      RDW 13.1 %      RDW-SD 42.4 fl      MPV 8.1 fL      Platelets 333 10*3/mm3     Scan Slide [[id number]] Collected:  02/29/20 1629    Specimen:  Blood  Updated:  02/29/20 1704     Scan Slide --     Comment: See Manual Differential Results       Manual Differential [[id number]]  (Abnormal) Collected:  02/29/20 1629    Specimen:  Blood Updated:  02/29/20 1704     Neutrophil % 65.0 %      Lymphocyte % 3.0 %      Monocyte % 2.0 %      Bands %  26.0 %      Metamyelocyte % 3.0 %      Myelocyte % 1.0 %      Neutrophils Absolute 26.30 10*3/mm3      Lymphocytes Absolute 0.87 10*3/mm3      Monocytes Absolute 0.58 10*3/mm3      RBC Morphology Normal     Toxic Granulation Slight/1+     Vacuolated Neutrophils Slight/1+     Giant Platelets Slight/1+    Basic Metabolic Panel [[id number]]  (Abnormal) Collected:  02/29/20 1629    Specimen:  Blood Updated:  02/29/20 1701     Glucose 114 mg/dL      BUN 10 mg/dL      Creatinine 0.79 mg/dL      Sodium 134 mmol/L      Potassium 4.0 mmol/L      Chloride 96 mmol/L      CO2 23.0 mmol/L      Calcium 9.7 mg/dL      eGFR  African Amer 110 mL/min/1.73      BUN/Creatinine Ratio 12.7     Anion Gap 15.0 mmol/L     Narrative:       GFR Normal >60  Chronic Kidney Disease <60  Kidney Failure <15          Xr Chest 1 View    Result Date: 3/1/2020   1. No active cardiopulmonary disease 2. No interval change from 05/27/2017  Electronically Signed By-Sumeet Mott Jr. On:3/1/2020 12:20 PM This report was finalized on [id number] by  Sumeet Mott Jr., .    Xr Abdomen Kub    Result Date: 3/1/2020   1. Nonspecific bowel pattern 2. No evidence of free air  Electronically Signed By-Sumeet Mott Jr. On:3/1/2020 12:21 PM This report was finalized on [id number] by  Sumeet Mott Jr., .        I reviewed the patient's new imaging results and agree with the interpretation.  I reviewed the patient's other test results and agree with the interpretation    Assessment/Plan     Active Problems:    Abdominal pain    Sepsis without acute organ dysfunction (CMS/HCC)    Ileus    Substance abuse (CMS/HCC)    Leukocytosis    Patient is a 24-year-old lady with multiple reasons  to have lower abdominal discomfort.  These include, but are not limited to appendicitis, gastroenteritis, bowel obstruction, side effects from illicit drug use, urinary tract infection, kidney stone, sexually transmitted infections, and/or PID.    Given the amount of abdominal discomfort that she is having and here elevated WBC, I would recommend repeating a CT scan of the abdomen pelvis, and this has been ordered.  Laboratory values reviewed, no evidence of pancreatitis or biliary source for her abdominal discomfort.  Urinalysis has not been performed.  I have reviewed the patient's abdominal x-ray and chest x-ray.  On the abdominal x-ray, I do appreciate dilated loops of small bowel which could represent an ileus, however she also has evidence of air within the colon, therefore my suspicion for obstruction is relatively low.  Rather, I believe the patient has an ileus.  Continue IV antibiotics  There are also inconsistencies with her history which includes being told that she was pregnant just a few days ago and having a negative pregnancy test at this hospital.  Given her history of unprotected sex with multiple partners, and the location of her abdominal discomfort, I have a suspicion that this may be related to STI/pelvic inflammatory disease causing peritonitis.  Therefore, I agree with OB/GYN consultation, and would recommend obtaining GC and chlamydia.      Javon Hammond MD  03/01/20  1:09 PM

## 2020-03-01 NOTE — PLAN OF CARE
Problem: Patient Care Overview  Goal: Plan of Care Review  Outcome: Ongoing (interventions implemented as appropriate)  Goal: Individualization and Mutuality  Outcome: Ongoing (interventions implemented as appropriate)  Goal: Discharge Needs Assessment  Outcome: Ongoing (interventions implemented as appropriate)  Goal: Interprofessional Rounds/Family Conf  Outcome: Ongoing (interventions implemented as appropriate)     Problem: Pain, Acute (Adult)  Goal: Identify Related Risk Factors and Signs and Symptoms  Outcome: Ongoing (interventions implemented as appropriate)  Goal: Acceptable Pain Control/Comfort Level  Outcome: Ongoing (interventions implemented as appropriate)     Problem: Sepsis/Septic Shock (Adult)  Goal: Signs and Symptoms of Listed Potential Problems Will be Absent, Minimized or Managed (Sepsis/Septic Shock)  Outcome: Ongoing (interventions implemented as appropriate)

## 2020-03-01 NOTE — CONSULTS
Referring Provider: Dr. Lou  Reason for Consultation: Abdominal pain    Patient Care Team:  Asia Covarrubias MD as PCP - General    Chief complaint Pt presented c/o 3 days of generalized abdominal pain.  [facility], at some point there was a question as to whether she had a positive pregnancy test, records from [facility] are available and her BHCG was negative there and here as well.  She had a pelvic ultrasound as well at [facility] which was negative/ normal.    She states the pain is generalized, some Nausea and vomiting yesterday but not today, although she states she is not hungry at this time.  Last BM was 3 days ago and she reports she is typically rmore regular, +flatus recently.   CT here and at [facility] showed similar findings of an illeus, however, today's CT here showed illeus with change of enteritis with small bowel thickening which is new as well as new onset of abdominal and pelvic ascites, mild, as well as atelectasis, which is new.    Pt states her LMP was late Dec/ early , however, her menses are not typically regular.  OB Hx C/S x 1 and SAB x 3.  Pt is sexually active with two recent partners and uses condoms occasionally.      Subjective .       Patient Active Problem List   Diagnosis   • Abdominal pain   • Sepsis without acute organ dysfunction (CMS/HCC)   • Ileus   • Substance abuse (CMS/HCC)   • Leukocytosis       History reviewed. No pertinent past medical history.    Past Surgical History:   Procedure Laterality Date   •  SECTION         #: 1, Date: None, Sex: None, Weight: None, GA: None, Delivery: None, Apgar1: None, Apgar5: None, Living: None, Birth Comments: None      No Known Allergies      Objective     Vital Signs   Vitals:    20 0253 20 0735 20 1132 20 1328   BP: 118/77 106/70 109/74    BP Location: Left arm Left arm Left arm    Patient Position: Lying Lying Lying    Pulse: 102 89  107 96   Resp:  28 24   Temp: 98.8 °F (37.1 °C) 98.3 °F (36.8 °C) 99.2 °F (37.3 °C) 98.4 °F (36.9 °C)   TempSrc: Oral Oral Oral Oral   SpO2: 95% 96% 98% 97%   Weight:       Height:         Temp (24hrs), Av.1 °F (37.3 °C), Min:98.3 °F (36.8 °C), Max:100.3 °F (37.9 °C)      Physical Exam:     General Appearance:    Appears uncomfortable, rolling around in the bed, answers questions with limited words.     Abdomen:    + Voluntary guarding, scant bowel sounds diffusely, no distention.     Genitalia:    Normal vulva, scant normal vaginal discharge, no cervical motion tenderness, uterus normal size, no palpable abnormality but pelvic exam limited by abdominal/ pelvic tenderness.      Lab Results:  Lab Results (last 48 hours)     Procedure Component Value Units Date/Time    HCG, B-subunit, Quantitative [[id number]] Collected:  20 1303    Specimen:  Blood Updated:  20 1328    Hepatic Function Panel [[id number]]  (Abnormal) Collected:  20    Specimen:  Blood Updated:  20 115     Total Protein 6.5 g/dL      Albumin 3.00 g/dL      ALT (SGPT) 10 U/L      AST (SGOT) 11 U/L      Alkaline Phosphatase 77 U/L      Total Bilirubin 0.3 mg/dL      Bilirubin, Direct <0.2 mg/dL      Bilirubin, Indirect --     Comment: Unable to calculate       Lipase [[id number]]  (Abnormal) Collected:  20    Specimen:  Blood Updated:  20 1151     Lipase 6 U/L     Basic Metabolic Panel [[id number]]  (Abnormal) Collected:  20    Specimen:  Blood Updated:  20 06     Glucose 98 mg/dL      BUN 12 mg/dL      Creatinine 0.74 mg/dL      Sodium 132 mmol/L      Potassium 3.6 mmol/L      Chloride 100 mmol/L      CO2 21.0 mmol/L      Calcium 8.9 mg/dL      eGFR  African Amer 119 mL/min/1.73      BUN/Creatinine Ratio 16.2     Anion Gap 11.0 mmol/L     Narrative:       GFR Normal >60  Chronic Kidney Disease <60  Kidney Failure <15      CBC Auto Differential [[id number]]  (Abnormal) Collected:   03/01/20 0530    Specimen:  Blood Updated:  03/01/20 0607     WBC 20.90 10*3/mm3      RBC 3.64 10*6/mm3      Hemoglobin 11.0 g/dL      Comment: Result checked         Hematocrit 33.7 %      MCV 92.5 fL      MCH 30.2 pg      MCHC 32.7 g/dL      RDW 12.9 %      RDW-SD 42.4 fl      MPV 8.2 fL      Platelets 265 10*3/mm3      Neutrophil % 90.7 %      Lymphocyte % 5.5 %      Monocyte % 3.2 %      Eosinophil % 0.5 %      Basophil % 0.1 %      Neutrophils, Absolute 18.90 10*3/mm3      Lymphocytes, Absolute 1.10 10*3/mm3      Monocytes, Absolute 0.70 10*3/mm3      Eosinophils, Absolute 0.10 10*3/mm3      Basophils, Absolute 0.00 10*3/mm3      nRBC 0.0 /100 WBC     Lactic Acid, Plasma [[id number]]  (Normal) Collected:  03/01/20 0530    Specimen:  Blood Updated:  03/01/20 0604     Lactate 1.0 mmol/L     Blood Culture - Blood, Arm, Left [[id number]] Collected:  02/29/20 1710    Specimen:  Blood from Arm, Left Updated:  03/01/20 0515     Blood Culture No growth at less than 24 hours    Blood Culture - Blood, Arm, Right [[id number]] Collected:  02/29/20 1710    Specimen:  Blood from Arm, Right Updated:  03/01/20 0515     Blood Culture No growth at less than 24 hours    Urine Drug Screen - Urine, Clean Catch [[id number]]  (Abnormal) Collected:  02/29/20 2027    Specimen:  Urine, Clean Catch Updated:  02/29/20 2103     Amphet/Methamphet, Screen Positive     Barbiturates Screen, Urine Negative     Benzodiazepine Screen, Urine Negative     Cocaine Screen, Urine Negative     Opiate Screen Positive     THC, Screen, Urine Positive     Methadone Screen, Urine Negative     Oxycodone Screen, Urine Negative    Narrative:       Negative Thresholds For Drugs Screened:     Amphetamines               500 ng/ml   Barbiturates               200 ng/ml   Benzodiazepines            100 ng/ml   Cocaine                    300 ng/ml   Methadone                  300 ng/ml   Opiates                    300 ng/ml   Oxycodone                  100 ng/ml   THC                         50 ng/ml    The Normal Value for all drugs tested is negative. This report includes final unconfirmed screening results to be used for medical treatment purposes only. Unconfirmed results must not be used for non-medical purposes such as employment or legal testing. Clinical consideration should be applied to any drug of abuse test, particulary when unconfirmed results are used.  All urine drugs of abuse requests without chain of custody are for medical screening purposes only.  False positives are possible.      hCG, Serum, Qualitative [[id number]]  (Normal) Collected:  02/29/20 2013    Specimen:  Blood Updated:  02/29/20 2040     HCG Qualitative Negative    POC Lactate [[id number]]  (Normal) Collected:  02/29/20 1714    Specimen:  Blood Updated:  02/29/20 1715     Lactate 0.9 mmol/L      Comment: Serial Number: [id number]:  [id number]       CBC & Differential [[id number]] Collected:  02/29/20 1629    Specimen:  Blood Updated:  02/29/20 1704    Narrative:       The following orders were created for panel order CBC & Differential.  Procedure                               Abnormality         Status                     ---------                               -----------         ------                     CBC Auto Differential[[id number]]        Abnormal            Final result                 Please view results for these tests on the individual orders.    CBC Auto Differential [[id number]]  (Abnormal) Collected:  02/29/20 1629    Specimen:  Blood Updated:  02/29/20 1704     WBC 28.90 10*3/mm3      RBC 4.34 10*6/mm3      Hemoglobin 13.4 g/dL      Hematocrit 40.0 %      MCV 92.2 fL      MCH 30.9 pg      MCHC 33.5 g/dL      RDW 13.1 %      RDW-SD 42.4 fl      MPV 8.1 fL      Platelets 333 10*3/mm3     Scan Slide [[id number]] Collected:  02/29/20 1629    Specimen:  Blood Updated:  02/29/20 1704     Scan Slide --     Comment: See Manual Differential Results       Manual Differential [[id number]]   (Abnormal) Collected:  02/29/20 1629    Specimen:  Blood Updated:  02/29/20 1704     Neutrophil % 65.0 %      Lymphocyte % 3.0 %      Monocyte % 2.0 %      Bands %  26.0 %      Metamyelocyte % 3.0 %      Myelocyte % 1.0 %      Neutrophils Absolute 26.30 10*3/mm3      Lymphocytes Absolute 0.87 10*3/mm3      Monocytes Absolute 0.58 10*3/mm3      RBC Morphology Normal     Toxic Granulation Slight/1+     Vacuolated Neutrophils Slight/1+     Giant Platelets Slight/1+    Basic Metabolic Panel [[id number]]  (Abnormal) Collected:  02/29/20 1629    Specimen:  Blood Updated:  02/29/20 1701     Glucose 114 mg/dL      BUN 10 mg/dL      Creatinine 0.79 mg/dL      Sodium 134 mmol/L      Potassium 4.0 mmol/L      Chloride 96 mmol/L      CO2 23.0 mmol/L      Calcium 9.7 mg/dL      eGFR  African Amer 110 mL/min/1.73      BUN/Creatinine Ratio 12.7     Anion Gap 15.0 mmol/L     Narrative:       GFR Normal >60  Chronic Kidney Disease <60  Kidney Failure <15            Radiology Results:  Imaging Results (Last 72 Hours)     Procedure Component Value Units Date/Time    CT Abdomen Pelvis With Contrast [[id number]] Collected:  03/01/20 1353     Updated:  03/01/20 1402    Narrative:       CT ABDOMEN PELVIS W CONTRAST-     Date of Exam: 3/1/2020 1:31 PM     Indication: Abdominal pain, unspecified; R10.9-Unspecified abdominal  pain; D72.829-Elevated white blood cell count, unspecified; K56.7-Ileus,  unspecified.    Patient's a 24-year-old female who presents with hypogastric pain for 3  days with nausea, for today's study 100 mL's of Isovue-370 were infused.     Comparison: Study of 03/03/2017, report was made available from [facility]  dated 02/29/2020 which described mild change of ileus with fluid-filled  loops of small bowel.     Technique: Contiguous axial CT images were obtained from the lung bases  to the superior iliac crests without contrast.  Following uneventful  administration of 100 intravenous and oral contrast, contiguous axial  images  obtained from lung bases to the pubic symphysis. Sagittal and  coronal reconstructions were performed.  Automated exposure control and  iterative reconstruction methods were used.     FINDINGS:  Today's study demonstrates interval change from prior CT report there is  been development of free fluid largest collection in the pelvis measures  over 5.6 x 3 cm on image 139 there remain several loops of mildly  distended loop of small bowel with air-fluid levels that are new from  the study of 2017 and at were reported in CT report from yesterday there  is mild small bowel wall thickening in the mid small bowel best  demonstrated on and around axial image 94 were the small bowel wall  measures up to 6 mm consistent with enteritis most likely due to  infection, inflammation or ischemia would be unlikely in this age group.  There is normal enhancement of the great vessels of the abdomen and  pelvis no mesenteric thrombus is seen in either mesenteric arteries or  veins. No gas in the bowel wall is identified. The liver, spleen,  pancreas, gallbladder, adrenal glands and both kidneys appear  unremarkable no retroperitoneal lymphadenopathy is seen there is a trace  amount of ascites in both right and left gutters. The lung windows  demonstrate mild bilateral basilar atelectasis.       Impression:          1. Interval change from yesterday's CT report, outside images are not  available, interval worsening  2. Changes are consistent with ileus with change of enteritis with small  bowel wall focal thickening in several mid small bowel loops-new from  yesterday's exam  3. Development of abdominal and pelvic ascites, mild in degree-new from  yesterday's report  4. Lung bases demonstrate mild atelectasis, new from prior study  5. no evidence of free air, adenopathy or mass or vascular abnormality     Electronically Signed By-Sumeet Mott Jr. On:3/1/2020 2:00 PM  This report was finalized on [id number] by  Sumeet Mott Jr., .     XR Abdomen KUB [[id number]] Collected:  03/01/20 1220     Updated:  03/01/20 1223    Narrative:       DATE OF EXAM:  3/1/2020 11:51 AM     PROCEDURE:  XR ABDOMEN KUB-     INDICATIONS:  severe abd pain; R10.9-Unspecified abdominal pain; D72.829-Elevated  white blood cell count, unspecified; K56.7-Ileus, unspecified     24-year-old female with severe lower abdominal pain, CT scan performed  yesterday at [facility] demonstrated change of ileus     COMPARISON:  No Comparisons Available     TECHNIQUE:   Single radiographic view of the abdomen was obtained.     FINDINGS:  Today's KUB demonstrates a nonspecific bowel pattern there is no  evidence of free air. The bony structures of the abdomen and pelvis  appear unremarkable.        Impression:          1. Nonspecific bowel pattern  2. No evidence of free air     Electronically Signed By-Sumeet Mott Jr. On:3/1/2020 12:21 PM  This report was finalized on [id number] by  Sumeet Mott Jr., .    XR Chest 1 View [[id number]] Collected:  03/01/20 1219     Updated:  03/01/20 1222    Narrative:       DATE OF EXAM:  3/1/2020 11:58 AM     PROCEDURE:  XR CHEST 1 VW-     INDICATIONS:  cp; R10.9-Unspecified abdominal pain; D72.829-Elevated white blood cell  count, unspecified; K56.7-Ileus, unspecified  Patient's a 24-year-old female with chest pain, positive smoking  history, substance abuse     COMPARISON:  Study of 05/27/2017     TECHNIQUE:   Single radiographic AP view of the chest was obtained.     FINDINGS:  Today's portable erect study was obtained on 03/01/2020 11:54 AM     The heart size is normal the lung fields are clear. The diaphragmatic  surfaces are smooth. The bony structures are intact. The upper abdomen  is unremarkable. There is no active cardiopulmonary disease.        Impression:          1. No active cardiopulmonary disease  2. No interval change from 05/27/2017     Electronically Signed By-Sumeet Mott Jr. On:3/1/2020 12:20 PM  This report was finalized on [id number]  by  Sumeet Mott Jr., .          Assessment/Plan     Abdominal pain  Leukocytosis with low grade temperature  High risk sexual behavior  Substance use disorder  Illeus    PID is certainly a possible etiology of her findings, however, on abdominal and pelvic exam she seems to be much more tender on abdominal exam than the pelvic, pelvic exam is less suspicious for PID.  GC/CHL cultures were obtained and will be sent.    Her current antibiotic regimen of Zosyn and Flagyl would provide good broad spectrum coverage for most potential sources of her symptoms and findings.     I would recommend based on her scant bowel sounds that she be kept NPO until return of normal bowel function.      Thank you for the consult.     I discussed the patients findings and my recommendations with patient    Sunni Alvarado MD  03/01/20  4:02 PM

## 2020-03-01 NOTE — CONSULTS
GI CONSULT  NOTE:    Referring Provider:    Dr Lou    Chief complaint:  Severe abdominal pain     Subjective    Abdominal pain for 3 to 4 days.    History of present illness:   Patient is a 24 year old female with a complaint of abdominal pain for 3-4 days.  Patient was initially seen at [facility] and told she may be pregnant then went to [facility], [facility] and now [place].  Patient presented on  emergency room for pelvic pain.  Patient had already had labs and CT done at [facility].  Patient states she has had a 1.3 fever.  She states her abdominal pain is described as a pressure worsened by coughing and activity.  No alleviating factors.  Patient states she normally has regular bowel movements but not had not had a bowel movement in the previous 3 days.  Patient denies any diarrhea.  Melena hematochezia or bright red blood per rectum.  Slight nausea but no vomiting.    Evidently the CT at [facility] showed possible ileus.  CT was repeated at [place] today with contrast-changes are consistent with ileus with enteritis and small bowel focal thickening.  Abdominal pelvic ascites.  Patient has been seen by OB for possible PID and pelvic cultures have been taken.  Patient has been seen by general surgery also.    Endo History:  None    Past Medical History:  History reviewed. No pertinent past medical history.    Past Surgical History:  Past Surgical History:   Procedure Laterality Date   •  SECTION         Social History:  Social History     Tobacco Use   • Smoking status: Current Every Day Smoker     Packs/day: 0.50     Types: Cigarettes     Start date: 2012   • Smokeless tobacco: Never Used   Substance Use Topics   • Alcohol use: Not Currently   • Drug use: Yes     Types: Methamphetamines, Marijuana     Comment: Patient denies heroin or hydrocodone or oxycodone use       Family History:  History reviewed. No pertinent family  history.    Medications:  Medications Prior to Admission   Medication Sig Dispense Refill Last Dose   • prazosin (MINIPRESS) 1 MG capsule Take 1 mg by mouth Every Night.   More than a month at Unknown time   • sertraline (ZOLOFT) 100 MG tablet Take 100 mg by mouth Daily.   More than a month at Unknown time   • traZODone (DESYREL) 100 MG tablet Take 50 mg by mouth Every Night.   More than a month at Unknown time       Scheduled Meds:  ketorolac 30 mg Intravenous Once   nicotine 1 patch Transdermal Q24H   pantoprazole 40 mg Intravenous Q12H   piperacillin-tazobactam 3.375 g Intravenous Q8H   sodium chloride 10 mL Intravenous Q12H     Continuous Infusions:  Pharmacy to Dose Zosyn     sodium chloride 100 mL/hr Last Rate: 100 mL/hr (03/01/20 6284)     PRN Meds:.•  acetaminophen  •  HYDROcodone-acetaminophen  •  ketorolac  •  ondansetron  •  Pharmacy to Dose Zosyn  •  [COMPLETED] Insert peripheral IV **AND** sodium chloride  •  sodium chloride    ALLERGIES:  Patient has no known allergies.    ROS:  Review of Systems   Constitutional: Positive for fever.   Gastrointestinal: Positive for abdominal pain, blood in stool and nausea. Negative for anal bleeding, constipation, diarrhea, rectal pain and vomiting.       The following systems were reviewed and negative;  Constitution:  No fevers, chills, no unintentional weight loss  Skin: no rash, no jaundice  Eyes:  No blurry vision, no eye pain  HENT:  No change in hearing or smell  Resp:  No dyspnea or cough  CV:  No chest pain or palpitations  :  No dysuria, hematuria  Musculoskeletal:  No leg cramps or arthralgias  Neuro:  No tremor, no numbness  Psych:  No depression or confsuion    Objective  Tearful, resting in bed     Vital Signs:   Vitals:    03/01/20 0735 03/01/20 1132 03/01/20 1328 03/01/20 1530   BP: 106/70 109/74     BP Location: Left arm Left arm     Patient Position: Lying Lying     Pulse: 89 107 96 106   Resp: 24 28 24 20   Temp: 98.3 °F (36.8 °C) 99.2 °F (37.3  °C) 98.4 °F (36.9 °C) 98.7 °F (37.1 °C)   TempSrc: Oral Oral Oral Oral   SpO2: 96% 98% 97% 98%   Weight:       Height:           Physical Exam:   General Appearance:    Awake and alert, in no acute distress   Head:    Normocephalic, without obvious abnormality, atraumatic   Eyes:            Conjunctivae normal, anicteric sclera, pupils equal   Ears:    Ears appear intact with no abnormalities noted   Throat:   No oral lesions, no thrush, oral mucosa moist   Neck:   Supple, no JVD   Lungs:     Clear to auscultation bilaterally, respirations regular, even and unlabored    Heart:    Regular rhythm and normal rate, normal S1 and S2, no            Murmur appreciated   Chest Wall:    No abnormalities observed   Abdomen:     Normal bowel sounds, soft, generalized tender-will not allow much exam, no rebound or guarding, non-distended, no hepatosplenomegaly   Rectal:     Deferred   Extremities:   Moves all extremities, no edema, no cyanosis   Pulses:   Pulses palpable and equal bilaterally   Skin:   No rash, no jaundice, normal palpaion   Lymph nodes:   No cervical, supraclavicular or submandibular palpable adenopathy   Neurologic:   Cranial nerves 2 - 12 grossly intact, no asterixis       Results Review:   I reviewed the patient's labs and imaging.  Lab Results (last 24 hours)     Procedure Component Value Units Date/Time    HCG, B-subunit, Quantitative [[id number]] Collected:  03/01/20 1303    Specimen:  Blood Updated:  03/01/20 1328    Hepatic Function Panel [[id number]]  (Abnormal) Collected:  03/01/20 0530    Specimen:  Blood Updated:  03/01/20 1151     Total Protein 6.5 g/dL      Albumin 3.00 g/dL      ALT (SGPT) 10 U/L      AST (SGOT) 11 U/L      Alkaline Phosphatase 77 U/L      Total Bilirubin 0.3 mg/dL      Bilirubin, Direct <0.2 mg/dL      Bilirubin, Indirect --     Comment: Unable to calculate       Lipase [[id number]]  (Abnormal) Collected:  03/01/20 0530    Specimen:  Blood Updated:  03/01/20 1151     Lipase 6 U/L      Basic Metabolic Panel [[id number]]  (Abnormal) Collected:  03/01/20 0530    Specimen:  Blood Updated:  03/01/20 0626     Glucose 98 mg/dL      BUN 12 mg/dL      Creatinine 0.74 mg/dL      Sodium 132 mmol/L      Potassium 3.6 mmol/L      Chloride 100 mmol/L      CO2 21.0 mmol/L      Calcium 8.9 mg/dL      eGFR  African Amer 119 mL/min/1.73      BUN/Creatinine Ratio 16.2     Anion Gap 11.0 mmol/L     Narrative:       GFR Normal >60  Chronic Kidney Disease <60  Kidney Failure <15      CBC Auto Differential [[id number]]  (Abnormal) Collected:  03/01/20 0530    Specimen:  Blood Updated:  03/01/20 0607     WBC 20.90 10*3/mm3      RBC 3.64 10*6/mm3      Hemoglobin 11.0 g/dL      Comment: Result checked         Hematocrit 33.7 %      MCV 92.5 fL      MCH 30.2 pg      MCHC 32.7 g/dL      RDW 12.9 %      RDW-SD 42.4 fl      MPV 8.2 fL      Platelets 265 10*3/mm3      Neutrophil % 90.7 %      Lymphocyte % 5.5 %      Monocyte % 3.2 %      Eosinophil % 0.5 %      Basophil % 0.1 %      Neutrophils, Absolute 18.90 10*3/mm3      Lymphocytes, Absolute 1.10 10*3/mm3      Monocytes, Absolute 0.70 10*3/mm3      Eosinophils, Absolute 0.10 10*3/mm3      Basophils, Absolute 0.00 10*3/mm3      nRBC 0.0 /100 WBC     Lactic Acid, Plasma [[id number]]  (Normal) Collected:  03/01/20 0530    Specimen:  Blood Updated:  03/01/20 0604     Lactate 1.0 mmol/L     Blood Culture - Blood, Arm, Left [[id number]] Collected:  02/29/20 1710    Specimen:  Blood from Arm, Left Updated:  03/01/20 0515     Blood Culture No growth at less than 24 hours    Blood Culture - Blood, Arm, Right [[id number]] Collected:  02/29/20 1710    Specimen:  Blood from Arm, Right Updated:  03/01/20 0515     Blood Culture No growth at less than 24 hours    Urine Drug Screen - Urine, Clean Catch [[id number]]  (Abnormal) Collected:  02/29/20 2027    Specimen:  Urine, Clean Catch Updated:  02/29/20 2103     Amphet/Methamphet, Screen Positive     Barbiturates Screen, Urine Negative      Benzodiazepine Screen, Urine Negative     Cocaine Screen, Urine Negative     Opiate Screen Positive     THC, Screen, Urine Positive     Methadone Screen, Urine Negative     Oxycodone Screen, Urine Negative    Narrative:       Negative Thresholds For Drugs Screened:     Amphetamines               500 ng/ml   Barbiturates               200 ng/ml   Benzodiazepines            100 ng/ml   Cocaine                    300 ng/ml   Methadone                  300 ng/ml   Opiates                    300 ng/ml   Oxycodone                  100 ng/ml   THC                        50 ng/ml    The Normal Value for all drugs tested is negative. This report includes final unconfirmed screening results to be used for medical treatment purposes only. Unconfirmed results must not be used for non-medical purposes such as employment or legal testing. Clinical consideration should be applied to any drug of abuse test, particulary when unconfirmed results are used.  All urine drugs of abuse requests without chain of custody are for medical screening purposes only.  False positives are possible.      hCG, Serum, Qualitative [[id number]]  (Normal) Collected:  02/29/20 2013    Specimen:  Blood Updated:  02/29/20 2040     HCG Qualitative Negative    POC Lactate [[id number]]  (Normal) Collected:  02/29/20 1714    Specimen:  Blood Updated:  02/29/20 1715     Lactate 0.9 mmol/L      Comment: Serial Number: [id number]:  [id number]       CBC & Differential [[id number]] Collected:  02/29/20 1629    Specimen:  Blood Updated:  02/29/20 1704    Narrative:       The following orders were created for panel order CBC & Differential.  Procedure                               Abnormality         Status                     ---------                               -----------         ------                     CBC Auto Differential[[id number]]        Abnormal            Final result                 Please view results for these tests on the individual orders.     CBC Auto Differential [[id number]]  (Abnormal) Collected:  02/29/20 1629    Specimen:  Blood Updated:  02/29/20 1704     WBC 28.90 10*3/mm3      RBC 4.34 10*6/mm3      Hemoglobin 13.4 g/dL      Hematocrit 40.0 %      MCV 92.2 fL      MCH 30.9 pg      MCHC 33.5 g/dL      RDW 13.1 %      RDW-SD 42.4 fl      MPV 8.1 fL      Platelets 333 10*3/mm3     Scan Slide [[id number]] Collected:  02/29/20 1629    Specimen:  Blood Updated:  02/29/20 1704     Scan Slide --     Comment: See Manual Differential Results       Manual Differential [[id number]]  (Abnormal) Collected:  02/29/20 1629    Specimen:  Blood Updated:  02/29/20 1704     Neutrophil % 65.0 %      Lymphocyte % 3.0 %      Monocyte % 2.0 %      Bands %  26.0 %      Metamyelocyte % 3.0 %      Myelocyte % 1.0 %      Neutrophils Absolute 26.30 10*3/mm3      Lymphocytes Absolute 0.87 10*3/mm3      Monocytes Absolute 0.58 10*3/mm3      RBC Morphology Normal     Toxic Granulation Slight/1+     Vacuolated Neutrophils Slight/1+     Giant Platelets Slight/1+    Basic Metabolic Panel [[id number]]  (Abnormal) Collected:  02/29/20 1629    Specimen:  Blood Updated:  02/29/20 1701     Glucose 114 mg/dL      BUN 10 mg/dL      Creatinine 0.79 mg/dL      Sodium 134 mmol/L      Potassium 4.0 mmol/L      Chloride 96 mmol/L      CO2 23.0 mmol/L      Calcium 9.7 mg/dL      eGFR  African Amer 110 mL/min/1.73      BUN/Creatinine Ratio 12.7     Anion Gap 15.0 mmol/L     Narrative:       GFR Normal >60  Chronic Kidney Disease <60  Kidney Failure <15            Imaging Results (Last 24 Hours)     Procedure Component Value Units Date/Time    CT Abdomen Pelvis With Contrast [[id number]] Collected:  03/01/20 1353     Updated:  03/01/20 1402    Narrative:       CT ABDOMEN PELVIS W CONTRAST-     Date of Exam: 3/1/2020 1:31 PM     Indication: Abdominal pain, unspecified; R10.9-Unspecified abdominal  pain; D72.829-Elevated white blood cell count, unspecified; K56.7-Ileus,  unspecified.    Patient's a  24-year-old female who presents with hypogastric pain for 3  days with nausea, for today's study 100 mL's of Isovue-370 were infused.     Comparison: Study of 03/03/2017, report was made available from [facility]  dated 02/29/2020 which described mild change of ileus with fluid-filled  loops of small bowel.     Technique: Contiguous axial CT images were obtained from the lung bases  to the superior iliac crests without contrast.  Following uneventful  administration of 100 intravenous and oral contrast, contiguous axial  images obtained from lung bases to the pubic symphysis. Sagittal and  coronal reconstructions were performed.  Automated exposure control and  iterative reconstruction methods were used.     FINDINGS:  Today's study demonstrates interval change from prior CT report there is  been development of free fluid largest collection in the pelvis measures  over 5.6 x 3 cm on image 139 there remain several loops of mildly  distended loop of small bowel with air-fluid levels that are new from  the study of 2017 and at were reported in CT report from yesterday there  is mild small bowel wall thickening in the mid small bowel best  demonstrated on and around axial image 94 were the small bowel wall  measures up to 6 mm consistent with enteritis most likely due to  infection, inflammation or ischemia would be unlikely in this age group.  There is normal enhancement of the great vessels of the abdomen and  pelvis no mesenteric thrombus is seen in either mesenteric arteries or  veins. No gas in the bowel wall is identified. The liver, spleen,  pancreas, gallbladder, adrenal glands and both kidneys appear  unremarkable no retroperitoneal lymphadenopathy is seen there is a trace  amount of ascites in both right and left gutters. The lung windows  demonstrate mild bilateral basilar atelectasis.       Impression:          1. Interval change from yesterday's CT report, outside images are not  available, interval worsening  2.  Changes are consistent with ileus with change of enteritis with small  bowel wall focal thickening in several mid small bowel loops-new from  yesterday's exam  3. Development of abdominal and pelvic ascites, mild in degree-new from  yesterday's report  4. Lung bases demonstrate mild atelectasis, new from prior study  5. no evidence of free air, adenopathy or mass or vascular abnormality     Electronically Signed By-Sumeet Mott Jr. On:3/1/2020 2:00 PM  This report was finalized on [id number] by  Sumeet Mott Jr., .    XR Abdomen KUB [[id number]] Collected:  03/01/20 1220     Updated:  03/01/20 1223    Narrative:       DATE OF EXAM:  3/1/2020 11:51 AM     PROCEDURE:  XR ABDOMEN KUB-     INDICATIONS:  severe abd pain; R10.9-Unspecified abdominal pain; D72.829-Elevated  white blood cell count, unspecified; K56.7-Ileus, unspecified     24-year-old female with severe lower abdominal pain, CT scan performed  yesterday at [facility] demonstrated change of ileus     COMPARISON:  No Comparisons Available     TECHNIQUE:   Single radiographic view of the abdomen was obtained.     FINDINGS:  Today's KUB demonstrates a nonspecific bowel pattern there is no  evidence of free air. The bony structures of the abdomen and pelvis  appear unremarkable.        Impression:          1. Nonspecific bowel pattern  2. No evidence of free air     Electronically Signed By-Sumeet Mott Jr. On:3/1/2020 12:21 PM  This report was finalized on [id number] by  Sumeet Mott Jr., .    XR Chest 1 View [[id number]] Collected:  03/01/20 1219     Updated:  03/01/20 1222    Narrative:       DATE OF EXAM:  3/1/2020 11:58 AM     PROCEDURE:  XR CHEST 1 VW-     INDICATIONS:  cp; R10.9-Unspecified abdominal pain; D72.829-Elevated white blood cell  count, unspecified; K56.7-Ileus, unspecified  Patient's a 24-year-old female with chest pain, positive smoking  history, substance abuse     COMPARISON:  Study of 05/27/2017     TECHNIQUE:   Single radiographic AP view of  the chest was obtained.     FINDINGS:  Today's portable erect study was obtained on 03/01/2020 11:54 AM     The heart size is normal the lung fields are clear. The diaphragmatic  surfaces are smooth. The bony structures are intact. The upper abdomen  is unremarkable. There is no active cardiopulmonary disease.        Impression:          1. No active cardiopulmonary disease  2. No interval change from 05/27/2017     Electronically Signed By-Sumeet Mott Jr. On:3/1/2020 12:20 PM  This report was finalized on [id number] by  Sumeet Mott Jr., .             ASSESSMENT AND PLAN:  Generalized abdominal tenderness with low abdominal pressure  Fever  Abnormal CT-ileus with enteritis, abdominal & pelvis ascites  Meth usage  Leukocytosis   Anemia       PLAN:   Continue Flagyl & Zosyn for enteritis vs PID  IV fluids for hydration  Diet as tolerated  Refrain from drug usage.   Awaiting pelvic culutres    I discussed the patients findings and my recommendations with the patient.  Carol Mccloud, JOHNIE  03/01/20  4:33 PM    Time:

## 2020-03-02 LAB
ALBUMIN SERPL-MCNC: 2.7 G/DL (ref 3.5–5.2)
ALBUMIN/GLOB SERPL: 0.8 G/DL
ALP SERPL-CCNC: 98 U/L (ref 39–117)
ALT SERPL W P-5'-P-CCNC: 8 U/L (ref 1–33)
ANION GAP SERPL CALCULATED.3IONS-SCNC: 12 MMOL/L (ref 5–15)
AST SERPL-CCNC: 11 U/L (ref 1–32)
BACTERIA UR QL AUTO: ABNORMAL /HPF
BILIRUB SERPL-MCNC: 0.3 MG/DL (ref 0.2–1.2)
BILIRUB UR QL STRIP: NEGATIVE
BUN BLD-MCNC: 8 MG/DL (ref 6–20)
BUN/CREAT SERPL: 13.1 (ref 7–25)
CALCIUM SPEC-SCNC: 8.1 MG/DL (ref 8.6–10.5)
CHLORIDE SERPL-SCNC: 101 MMOL/L (ref 98–107)
CLARITY UR: ABNORMAL
CO2 SERPL-SCNC: 22 MMOL/L (ref 22–29)
COLOR UR: YELLOW
CREAT BLD-MCNC: 0.61 MG/DL (ref 0.57–1)
DEPRECATED RDW RBC AUTO: 42.4 FL (ref 37–54)
ERYTHROCYTE [DISTWIDTH] IN BLOOD BY AUTOMATED COUNT: 12.9 % (ref 12.3–15.4)
GFR SERPL CREATININE-BSD FRML MDRD: 149 ML/MIN/1.73
GLOBULIN UR ELPH-MCNC: 3.3 GM/DL
GLUCOSE BLD-MCNC: 89 MG/DL (ref 65–99)
GLUCOSE UR STRIP-MCNC: NEGATIVE MG/DL
HCT VFR BLD AUTO: 29.7 % (ref 34–46.6)
HGB BLD-MCNC: 9.6 G/DL (ref 12–15.9)
HGB UR QL STRIP.AUTO: NEGATIVE
HYALINE CASTS UR QL AUTO: ABNORMAL /LPF
KETONES UR QL STRIP: NEGATIVE
LEUKOCYTE ESTERASE UR QL STRIP.AUTO: ABNORMAL
LYMPHOCYTES # BLD MANUAL: 1.01 10*3/MM3 (ref 0.7–3.1)
LYMPHOCYTES NFR BLD MANUAL: 4 % (ref 5–12)
LYMPHOCYTES NFR BLD MANUAL: 6 % (ref 19.6–45.3)
MCH RBC QN AUTO: 30.1 PG (ref 26.6–33)
MCHC RBC AUTO-ENTMCNC: 32.3 G/DL (ref 31.5–35.7)
MCV RBC AUTO: 93 FL (ref 79–97)
MONOCYTES # BLD AUTO: 0.68 10*3/MM3 (ref 0.1–0.9)
NEUTROPHILS # BLD AUTO: 15.21 10*3/MM3 (ref 1.7–7)
NEUTROPHILS NFR BLD MANUAL: 81 % (ref 42.7–76)
NEUTS BAND NFR BLD MANUAL: 9 % (ref 0–5)
NITRITE UR QL STRIP: NEGATIVE
PH UR STRIP.AUTO: 6 [PH] (ref 5–8)
PLAT MORPH BLD: NORMAL
PLATELET # BLD AUTO: 238 10*3/MM3 (ref 140–450)
PMV BLD AUTO: 8.5 FL (ref 6–12)
POTASSIUM BLD-SCNC: 3.8 MMOL/L (ref 3.5–5.2)
PROT SERPL-MCNC: 6 G/DL (ref 6–8.5)
PROT UR QL STRIP: ABNORMAL
RBC # BLD AUTO: 3.19 10*6/MM3 (ref 3.77–5.28)
RBC # UR: ABNORMAL /HPF
RBC MORPH BLD: NORMAL
REF LAB TEST METHOD: ABNORMAL
SODIUM BLD-SCNC: 135 MMOL/L (ref 136–145)
SP GR UR STRIP: 1.03 (ref 1–1.03)
SQUAMOUS #/AREA URNS HPF: ABNORMAL /HPF
UROBILINOGEN UR QL STRIP: ABNORMAL
WBC MORPH BLD: NORMAL
WBC NRBC COR # BLD: 16.9 10*3/MM3 (ref 3.4–10.8)
WBC UR QL AUTO: ABNORMAL /HPF

## 2020-03-02 PROCEDURE — 87491 CHLMYD TRACH DNA AMP PROBE: CPT | Performed by: INTERNAL MEDICINE

## 2020-03-02 PROCEDURE — 80053 COMPREHEN METABOLIC PANEL: CPT | Performed by: INTERNAL MEDICINE

## 2020-03-02 PROCEDURE — 99232 SBSQ HOSP IP/OBS MODERATE 35: CPT | Performed by: INTERNAL MEDICINE

## 2020-03-02 PROCEDURE — 87040 BLOOD CULTURE FOR BACTERIA: CPT | Performed by: INTERNAL MEDICINE

## 2020-03-02 PROCEDURE — 87591 N.GONORRHOEAE DNA AMP PROB: CPT | Performed by: INTERNAL MEDICINE

## 2020-03-02 PROCEDURE — G0378 HOSPITAL OBSERVATION PER HR: HCPCS

## 2020-03-02 PROCEDURE — 25010000002 KETOROLAC TROMETHAMINE PER 15 MG: Performed by: NURSE PRACTITIONER

## 2020-03-02 PROCEDURE — 85007 BL SMEAR W/DIFF WBC COUNT: CPT | Performed by: INTERNAL MEDICINE

## 2020-03-02 PROCEDURE — 25010000002 PIPERACILLIN SOD-TAZOBACTAM PER 1 G: Performed by: INTERNAL MEDICINE

## 2020-03-02 PROCEDURE — 87086 URINE CULTURE/COLONY COUNT: CPT | Performed by: INTERNAL MEDICINE

## 2020-03-02 PROCEDURE — 81001 URINALYSIS AUTO W/SCOPE: CPT | Performed by: INTERNAL MEDICINE

## 2020-03-02 PROCEDURE — 85027 COMPLETE CBC AUTOMATED: CPT | Performed by: INTERNAL MEDICINE

## 2020-03-02 PROCEDURE — 99232 SBSQ HOSP IP/OBS MODERATE 35: CPT | Performed by: SURGERY

## 2020-03-02 RX ORDER — TRAMADOL HYDROCHLORIDE 50 MG/1
50 TABLET ORAL EVERY 6 HOURS PRN
Status: DISCONTINUED | OUTPATIENT
Start: 2020-03-02 | End: 2020-03-03 | Stop reason: HOSPADM

## 2020-03-02 RX ADMIN — HYDROCODONE BITARTRATE AND ACETAMINOPHEN 1 TABLET: 7.5; 325 TABLET ORAL at 20:06

## 2020-03-02 RX ADMIN — PIPERACILLIN AND TAZOBACTAM 3.38 G: 3; .375 INJECTION, POWDER, FOR SOLUTION INTRAVENOUS at 12:29

## 2020-03-02 RX ADMIN — Medication 10 ML: at 20:15

## 2020-03-02 RX ADMIN — KETOROLAC TROMETHAMINE 15 MG: 15 INJECTION, SOLUTION INTRAMUSCULAR; INTRAVENOUS at 16:13

## 2020-03-02 RX ADMIN — PIPERACILLIN AND TAZOBACTAM 3.38 G: 3; .375 INJECTION, POWDER, FOR SOLUTION INTRAVENOUS at 04:28

## 2020-03-02 RX ADMIN — PANTOPRAZOLE SODIUM 40 MG: 40 INJECTION, POWDER, FOR SOLUTION INTRAVENOUS at 09:49

## 2020-03-02 RX ADMIN — HYDROCODONE BITARTRATE AND ACETAMINOPHEN 1 TABLET: 7.5; 325 TABLET ORAL at 05:18

## 2020-03-02 RX ADMIN — PIPERACILLIN AND TAZOBACTAM 3.38 G: 3; .375 INJECTION, POWDER, FOR SOLUTION INTRAVENOUS at 20:05

## 2020-03-02 RX ADMIN — HYDROCODONE BITARTRATE AND ACETAMINOPHEN 1 TABLET: 7.5; 325 TABLET ORAL at 12:29

## 2020-03-02 RX ADMIN — SODIUM CHLORIDE 100 ML/HR: 900 INJECTION, SOLUTION INTRAVENOUS at 10:08

## 2020-03-02 RX ADMIN — PANTOPRAZOLE SODIUM 40 MG: 40 INJECTION, POWDER, FOR SOLUTION INTRAVENOUS at 20:05

## 2020-03-02 RX ADMIN — KETOROLAC TROMETHAMINE 15 MG: 15 INJECTION, SOLUTION INTRAMUSCULAR; INTRAVENOUS at 09:49

## 2020-03-02 NOTE — PROGRESS NOTES
LOS: 0 days   Patient Care Team:  Asia Covarrubias MD as PCP - General    Chief Complaint:  Abdominal pain    Subjective    Pt states abdominal pain same as yesterday. Denies any improvement. Reports some urinary incontinence. Denies issues with bowel movements.    Interval History:       History taken from: patient and chart    Review of Systems:    All systems were reviewed and negative except for:  Gastrointestinal: positive for  abdominal pain. Urinary incontinence.    Objective     Vital Signs  Vitals:    03/02/20 0214 03/02/20 0437 03/02/20 0715 03/02/20 1238   BP: 99/63  103/67 117/74   BP Location: Left arm  Left arm    Patient Position: Lying  Sitting    Pulse: 102  112 106   Resp: 16 18 20 18   Temp: 98.7 °F (37.1 °C)  100 °F (37.8 °C) 98.8 °F (37.1 °C)   TempSrc: Oral  Oral Axillary   SpO2: 97%  97% 96%   Weight:       Height:           Physical Exam:     General Appearance:    Alert, cooperative, in no acute distress   Lungs:     Clear to auscultation,respirations regular, even and                   unlabored    Heart:    Regular rhythm and normal rate.   Breast Exam:    Deferred   Abdomen:     Normal bowel sounds, no masses, no organomegaly, soft        non-tender, non-distended, no guarding, no rebound                 tenderness   Genitalia:    Deferred   Extremities:   Moves all extremities well, no edema, no cyanosis, no             redness        Labs:  Lab Results (last 48 hours)     Procedure Component Value Units Date/Time    Urinalysis With Culture If Indicated - Urine, Clean Catch [[id number]]  (Abnormal) Collected:  03/02/20 1126    Specimen:  Urine, Clean Catch Updated:  03/02/20 1205     Color, UA Yellow     Appearance, UA Cloudy     Comment: Result checked         pH, UA 6.0     Specific Gravity, UA 1.026     Glucose, UA Negative     Ketones, UA Negative     Bilirubin, UA Negative     Blood, UA Negative     Protein, UA 30 mg/dL (1+)     Leuk Esterase, UA Small (1+)     Nitrite, UA  Negative     Urobilinogen, UA 1.0 E.U./dL    Urinalysis, Microscopic Only - Urine, Clean Catch [[id number]]  (Abnormal) Collected:  03/02/20 1126    Specimen:  Urine, Clean Catch Updated:  03/02/20 1205     RBC, UA 6-12 /HPF      WBC, UA 21-30 /HPF      Bacteria, UA Trace /HPF      Squamous Epithelial Cells, UA 7-12 /HPF      Hyaline Casts, UA 3-6 /LPF      Methodology Automated Microscopy    Urine Culture - Urine, Urine, Clean Catch [[id number]] Collected:  03/02/20 1126    Specimen:  Urine, Clean Catch Updated:  03/02/20 1205    CBC & Differential [[id number]] Collected:  03/02/20 0747    Specimen:  Blood Updated:  03/02/20 0924    Narrative:       The following orders were created for panel order CBC & Differential.  Procedure                               Abnormality         Status                     ---------                               -----------         ------                     Manual Differential[[id number]]          Abnormal            Final result               CBC Auto Differential[[id number]]        Abnormal            Final result                 Please view results for these tests on the individual orders.    Manual Differential [[id number]]  (Abnormal) Collected:  03/02/20 0747    Specimen:  Blood Updated:  03/02/20 0924     Neutrophil % 81.0 %      Lymphocyte % 6.0 %      Monocyte % 4.0 %      Bands %  9.0 %      Neutrophils Absolute 15.21 10*3/mm3      Lymphocytes Absolute 1.01 10*3/mm3      Monocytes Absolute 0.68 10*3/mm3      RBC Morphology Normal     WBC Morphology Normal     Platelet Morphology Normal    Comprehensive Metabolic Panel [[id number]]  (Abnormal) Collected:  03/02/20 0747    Specimen:  Blood Updated:  03/02/20 0912     Glucose 89 mg/dL      BUN 8 mg/dL      Creatinine 0.61 mg/dL      Sodium 135 mmol/L      Potassium 3.8 mmol/L      Chloride 101 mmol/L      CO2 22.0 mmol/L      Calcium 8.1 mg/dL      Total Protein 6.0 g/dL      Albumin 2.70 g/dL      ALT (SGPT) 8 U/L      AST (SGOT)  11 U/L      Alkaline Phosphatase 98 U/L      Total Bilirubin 0.3 mg/dL      eGFR  African Amer 149 mL/min/1.73      Globulin 3.3 gm/dL      A/G Ratio 0.8 g/dL      BUN/Creatinine Ratio 13.1     Anion Gap 12.0 mmol/L     Narrative:       GFR Normal >60  Chronic Kidney Disease <60  Kidney Failure <15      CBC Auto Differential [[id number]]  (Abnormal) Collected:  03/02/20 0747    Specimen:  Blood Updated:  03/02/20 0902     WBC 16.90 10*3/mm3      RBC 3.19 10*6/mm3      Hemoglobin 9.6 g/dL      Hematocrit 29.7 %      MCV 93.0 fL      MCH 30.1 pg      MCHC 32.3 g/dL      RDW 12.9 %      RDW-SD 42.4 fl      MPV 8.5 fL      Platelets 238 10*3/mm3     Influenza Antigen, Rapid - Swab, Nasopharynx [[id number]]  (Normal) Collected:  03/01/20 1655    Specimen:  Swab from Nasopharynx Updated:  03/01/20 1733     Influenza A PCR Not Detected     Influenza B PCR Not Detected    Blood Culture - Blood, Arm, Left [[id number]] Collected:  02/29/20 1710    Specimen:  Blood from Arm, Left Updated:  03/01/20 1730     Blood Culture No growth at 24 hours    Blood Culture - Blood, Arm, Right [[id number]] Collected:  02/29/20 1710    Specimen:  Blood from Arm, Right Updated:  03/01/20 1715     Blood Culture No growth at 24 hours    Chlamydia trachomatis, Neisseria gonorrhoeae, PCR w/ confirmation - Swab, Vagina [[id number]] Collected:  03/01/20 1615    Specimen:  Swab from Vagina Updated:  03/01/20 1637    HCG, B-subunit, Quantitative [[id number]] Collected:  03/01/20 1303    Specimen:  Blood Updated:  03/01/20 1328    Hepatic Function Panel [[id number]]  (Abnormal) Collected:  03/01/20 0530    Specimen:  Blood Updated:  03/01/20 1151     Total Protein 6.5 g/dL      Albumin 3.00 g/dL      ALT (SGPT) 10 U/L      AST (SGOT) 11 U/L      Alkaline Phosphatase 77 U/L      Total Bilirubin 0.3 mg/dL      Bilirubin, Direct <0.2 mg/dL      Bilirubin, Indirect --     Comment: Unable to calculate       Lipase [[id number]]  (Abnormal) Collected:  03/01/20 0530     Specimen:  Blood Updated:  03/01/20 1151     Lipase 6 U/L     Basic Metabolic Panel [[id number]]  (Abnormal) Collected:  03/01/20 0530    Specimen:  Blood Updated:  03/01/20 0626     Glucose 98 mg/dL      BUN 12 mg/dL      Creatinine 0.74 mg/dL      Sodium 132 mmol/L      Potassium 3.6 mmol/L      Chloride 100 mmol/L      CO2 21.0 mmol/L      Calcium 8.9 mg/dL      eGFR  African Amer 119 mL/min/1.73      BUN/Creatinine Ratio 16.2     Anion Gap 11.0 mmol/L     Narrative:       GFR Normal >60  Chronic Kidney Disease <60  Kidney Failure <15      CBC Auto Differential [[id number]]  (Abnormal) Collected:  03/01/20 0530    Specimen:  Blood Updated:  03/01/20 0607     WBC 20.90 10*3/mm3      RBC 3.64 10*6/mm3      Hemoglobin 11.0 g/dL      Comment: Result checked         Hematocrit 33.7 %      MCV 92.5 fL      MCH 30.2 pg      MCHC 32.7 g/dL      RDW 12.9 %      RDW-SD 42.4 fl      MPV 8.2 fL      Platelets 265 10*3/mm3      Neutrophil % 90.7 %      Lymphocyte % 5.5 %      Monocyte % 3.2 %      Eosinophil % 0.5 %      Basophil % 0.1 %      Neutrophils, Absolute 18.90 10*3/mm3      Lymphocytes, Absolute 1.10 10*3/mm3      Monocytes, Absolute 0.70 10*3/mm3      Eosinophils, Absolute 0.10 10*3/mm3      Basophils, Absolute 0.00 10*3/mm3      nRBC 0.0 /100 WBC     Lactic Acid, Plasma [[id number]]  (Normal) Collected:  03/01/20 0530    Specimen:  Blood Updated:  03/01/20 0604     Lactate 1.0 mmol/L     Urine Drug Screen - Urine, Clean Catch [[id number]]  (Abnormal) Collected:  02/29/20 2027    Specimen:  Urine, Clean Catch Updated:  02/29/20 2103     Amphet/Methamphet, Screen Positive     Barbiturates Screen, Urine Negative     Benzodiazepine Screen, Urine Negative     Cocaine Screen, Urine Negative     Opiate Screen Positive     THC, Screen, Urine Positive     Methadone Screen, Urine Negative     Oxycodone Screen, Urine Negative    Narrative:       Negative Thresholds For Drugs Screened:     Amphetamines               500  ng/ml   Barbiturates               200 ng/ml   Benzodiazepines            100 ng/ml   Cocaine                    300 ng/ml   Methadone                  300 ng/ml   Opiates                    300 ng/ml   Oxycodone                  100 ng/ml   THC                        50 ng/ml    The Normal Value for all drugs tested is negative. This report includes final unconfirmed screening results to be used for medical treatment purposes only. Unconfirmed results must not be used for non-medical purposes such as employment or legal testing. Clinical consideration should be applied to any drug of abuse test, particulary when unconfirmed results are used.  All urine drugs of abuse requests without chain of custody are for medical screening purposes only.  False positives are possible.      hCG, Serum, Qualitative [[id number]]  (Normal) Collected:  02/29/20 2013    Specimen:  Blood Updated:  02/29/20 2040     HCG Qualitative Negative    POC Lactate [[id number]]  (Normal) Collected:  02/29/20 1714    Specimen:  Blood Updated:  02/29/20 1715     Lactate 0.9 mmol/L      Comment: Serial Number: [id number]:  [id number]       CBC & Differential [[id number]] Collected:  02/29/20 1629    Specimen:  Blood Updated:  02/29/20 1704    Narrative:       The following orders were created for panel order CBC & Differential.  Procedure                               Abnormality         Status                     ---------                               -----------         ------                     CBC Auto Differential[[id number]]        Abnormal            Final result                 Please view results for these tests on the individual orders.    CBC Auto Differential [[id number]]  (Abnormal) Collected:  02/29/20 1629    Specimen:  Blood Updated:  02/29/20 1704     WBC 28.90 10*3/mm3      RBC 4.34 10*6/mm3      Hemoglobin 13.4 g/dL      Hematocrit 40.0 %      MCV 92.2 fL      MCH 30.9 pg      MCHC 33.5 g/dL      RDW 13.1 %      RDW-SD 42.4 fl       MPV 8.1 fL      Platelets 333 10*3/mm3     Scan Slide [[id number]] Collected:  02/29/20 1629    Specimen:  Blood Updated:  02/29/20 1704     Scan Slide --     Comment: See Manual Differential Results       Manual Differential [[id number]]  (Abnormal) Collected:  02/29/20 1629    Specimen:  Blood Updated:  02/29/20 1704     Neutrophil % 65.0 %      Lymphocyte % 3.0 %      Monocyte % 2.0 %      Bands %  26.0 %      Metamyelocyte % 3.0 %      Myelocyte % 1.0 %      Neutrophils Absolute 26.30 10*3/mm3      Lymphocytes Absolute 0.87 10*3/mm3      Monocytes Absolute 0.58 10*3/mm3      RBC Morphology Normal     Toxic Granulation Slight/1+     Vacuolated Neutrophils Slight/1+     Giant Platelets Slight/1+    Basic Metabolic Panel [[id number]]  (Abnormal) Collected:  02/29/20 1629    Specimen:  Blood Updated:  02/29/20 1701     Glucose 114 mg/dL      BUN 10 mg/dL      Creatinine 0.79 mg/dL      Sodium 134 mmol/L      Potassium 4.0 mmol/L      Chloride 96 mmol/L      CO2 23.0 mmol/L      Calcium 9.7 mg/dL      eGFR  African Amer 110 mL/min/1.73      BUN/Creatinine Ratio 12.7     Anion Gap 15.0 mmol/L     Narrative:       GFR Normal >60  Chronic Kidney Disease <60  Kidney Failure <15            Radiology:  Imaging Results (Last 48 Hours)     Procedure Component Value Units Date/Time    CT Abdomen Pelvis With Contrast [[id number]] Collected:  03/01/20 1353     Updated:  03/01/20 1402    Narrative:       CT ABDOMEN PELVIS W CONTRAST-     Date of Exam: 3/1/2020 1:31 PM     Indication: Abdominal pain, unspecified; R10.9-Unspecified abdominal  pain; D72.829-Elevated white blood cell count, unspecified; K56.7-Ileus,  unspecified.    Patient's a 24-year-old female who presents with hypogastric pain for 3  days with nausea, for today's study 100 mL's of Isovue-370 were infused.     Comparison: Study of 03/03/2017, report was made available from [facility]  dated 02/29/2020 which described mild change of ileus with fluid-filled  loops of  small bowel.     Technique: Contiguous axial CT images were obtained from the lung bases  to the superior iliac crests without contrast.  Following uneventful  administration of 100 intravenous and oral contrast, contiguous axial  images obtained from lung bases to the pubic symphysis. Sagittal and  coronal reconstructions were performed.  Automated exposure control and  iterative reconstruction methods were used.     FINDINGS:  Today's study demonstrates interval change from prior CT report there is  been development of free fluid largest collection in the pelvis measures  over 5.6 x 3 cm on image 139 there remain several loops of mildly  distended loop of small bowel with air-fluid levels that are new from  the study of 2017 and at were reported in CT report from yesterday there  is mild small bowel wall thickening in the mid small bowel best  demonstrated on and around axial image 94 were the small bowel wall  measures up to 6 mm consistent with enteritis most likely due to  infection, inflammation or ischemia would be unlikely in this age group.  There is normal enhancement of the great vessels of the abdomen and  pelvis no mesenteric thrombus is seen in either mesenteric arteries or  veins. No gas in the bowel wall is identified. The liver, spleen,  pancreas, gallbladder, adrenal glands and both kidneys appear  unremarkable no retroperitoneal lymphadenopathy is seen there is a trace  amount of ascites in both right and left gutters. The lung windows  demonstrate mild bilateral basilar atelectasis.       Impression:          1. Interval change from yesterday's CT report, outside images are not  available, interval worsening  2. Changes are consistent with ileus with change of enteritis with small  bowel wall focal thickening in several mid small bowel loops-new from  yesterday's exam  3. Development of abdominal and pelvic ascites, mild in degree-new from  yesterday's report  4. Lung bases demonstrate mild  atelectasis, new from prior study  5. no evidence of free air, adenopathy or mass or vascular abnormality     Electronically Signed By-Sumeet Mott Jr. On:3/1/2020 2:00 PM  This report was finalized on [id number] by  Sumeet Mott Jr., .    XR Abdomen KUB [[id number]] Collected:  03/01/20 1220     Updated:  03/01/20 1223    Narrative:       DATE OF EXAM:  3/1/2020 11:51 AM     PROCEDURE:  XR ABDOMEN KUB-     INDICATIONS:  severe abd pain; R10.9-Unspecified abdominal pain; D72.829-Elevated  white blood cell count, unspecified; K56.7-Ileus, unspecified     24-year-old female with severe lower abdominal pain, CT scan performed  yesterday at [facility] demonstrated change of ileus     COMPARISON:  No Comparisons Available     TECHNIQUE:   Single radiographic view of the abdomen was obtained.     FINDINGS:  Today's KUB demonstrates a nonspecific bowel pattern there is no  evidence of free air. The bony structures of the abdomen and pelvis  appear unremarkable.        Impression:          1. Nonspecific bowel pattern  2. No evidence of free air     Electronically Signed By-Sumeet Mott Jr. On:3/1/2020 12:21 PM  This report was finalized on [id number] by  Sumeet Mott Jr., .    XR Chest 1 View [[id number]] Collected:  03/01/20 1219     Updated:  03/01/20 1222    Narrative:       DATE OF EXAM:  3/1/2020 11:58 AM     PROCEDURE:  XR CHEST 1 VW-     INDICATIONS:  cp; R10.9-Unspecified abdominal pain; D72.829-Elevated white blood cell  count, unspecified; K56.7-Ileus, unspecified  Patient's a 24-year-old female with chest pain, positive smoking  history, substance abuse     COMPARISON:  Study of 05/27/2017     TECHNIQUE:   Single radiographic AP view of the chest was obtained.     FINDINGS:  Today's portable erect study was obtained on 03/01/2020 11:54 AM     The heart size is normal the lung fields are clear. The diaphragmatic  surfaces are smooth. The bony structures are intact. The upper abdomen  is unremarkable. There is no  active cardiopulmonary disease.        Impression:          1. No active cardiopulmonary disease  2. No interval change from 05/27/2017     Electronically Signed By-Sumeet Mott Jr. On:3/1/2020 12:20 PM  This report was finalized on [id number] by  Sumeet Mott Jr., .            Assessment/Plan       Abdominal pain    Sepsis without acute organ dysfunction (CMS/HCC)    Ileus    Substance abuse (CMS/HCC)    Leukocytosis      Awaiting culture results for GC/CT. Pt being managed by hospitalist. Recommend continuing present management.    JOHNIE Queen  03/02/20  2:29 PM

## 2020-03-02 NOTE — PROGRESS NOTES
[facility] Medicine Services Daily Progress Note      Hospitalist Team  LOS 0 days      Patient Care Team:  Asia Covarrubias MD as PCP - General    Patient Location: 13/1      Subjective   Subjective     Chief Complaint / Subjective  Chief Complaint   Patient presents with   • Abdominal Pain         Brief Synopsis of Hospital Course/HPI    24-year-old female presents the ER with chief complaint of severe bilateral lower quadrant abdominal pain which started 3 days ago.  Patient reports associated subjective fever and chills.  She was seen at [facility] ER for CT of abdomen showing mild ileus treated and released.  Today the abdominal pain came back and the patient decided to come in for further evaluation.  He patient is noted to be mildly tachycardic at time of interview.  Patient denies personal or family history of IBS or IBD.  The patient also reports bilateral lower extremity pain and twitching.the patient reports she last used methamphetamines 3 days ago.  She states she quit using methamphetamines because she was just quitting.     Urine drug screen positive for methamphetamine, opiates and THC.  The patient admits to doing methamphetamine and marijuana over the last 5 to 7 years.  She denies opioid use.  She reports she has never done IV drugs not even once in the past.     There was some confusion and the patient was showing up under pregnancy diagnosis.  Review of records from [facility] show that the patient's urine hCG was negative.  Urine qualitative blood sample taken for pregnancy which was negative at this facility.     Review of records from prior facility show probable ileus.            Date::    3./1  Severe abdominal pain.  Patient crying  Severe tenderness on exam  Discussed with Dr. Hammond regarding further work-up plans CT abdomen pelvis is recommended.  Keep n.p.o.  Pain medications addressed  Nursing staff concerned about patient  "previously told she had positive pregnancy test.  Repeat hCG ordered.  Consult GI and OB/GYN  Check lipase and LFT    3/2/2020: Patient seen and examined and patient seemed to doing fairly well and denies any new complaints.  Patient continues to use pain medications.  When I have seen the patient she was very comfortable but does complain of some abdominal pain.  We are repeating some labs tomorrow.  I have reviewed general surgery as well as OB/GYN notes.    ROS    All other systems reviewed and neg      Objective   Objective      Vital Signs  Temp:  [98.7 °F (37.1 °C)-101.2 °F (38.4 °C)] 99.9 °F (37.7 °C)  Heart Rate:  [102-120] 106  Resp:  [16-22] 22  BP: ()/(63-78) 118/78  Oxygen Therapy  SpO2: 92 %  Pulse Oximetry Type: Intermittent  Device (Oxygen Therapy): room air  Flowsheet Rows      First Filed Value   Admission Height  157.5 cm (62\") Documented at 02/29/2020 1453   Admission Weight  77.1 kg (170 lb) Documented at 02/29/2020 1453        Intake & Output (last 3 days)       02/28 0701 - 02/29 0700 02/29 0701 - 03/01 0700 03/01 0701 - 03/02 0700 03/02 0701 - 03/03 0700    P.O.   325     I.V. (mL/kg)   2985 (35.7)     IV Piggyback   200     Total Intake(mL/kg)   3510 (42)     Urine (mL/kg/hr)  100 600 (0.3) 850 (1)    Total Output  100 600 850    Net  -100 +2910 -850            Urine Unmeasured Occurrence   2 x         Lines, Drains & Airways    Active LDAs     Name:   Placement date:   Placement time:   Site:   Days:    Peripheral IV 02/29/20 1629 Right Antecubital   02/29/20 1629    Antecubital   less than 1                  Physical Exam:    Physical Exam   Constitutional: She is oriented to person, place, and time.   Crying in pain   HENT:   Head: Normocephalic and atraumatic.   Eyes: Conjunctivae and lids are normal.   Neck: Trachea normal. No thyroid mass present.   Cardiovascular: Normal heart sounds.   Pulmonary/Chest: She has rhonchi in the right middle field, the right lower field, the left " middle field and the left lower field.   Abdominal:   Diffuse severe tenderness   Genitourinary:   Genitourinary Comments: Deferred   Neurological: She is alert and oriented to person, place, and time. She has normal strength. No cranial nerve deficit.             Procedures:              Results Review:     I reviewed the patient's new clinical results.      Lab Results (last 24 hours)     Procedure Component Value Units Date/Time    Blood Culture - Blood, Arm, Right [[id number]] Collected:  02/29/20 1710    Specimen:  Blood from Arm, Right Updated:  03/02/20 1715     Blood Culture No growth at 2 days    Urinalysis With Culture If Indicated - Urine, Clean Catch [[id number]]  (Abnormal) Collected:  03/02/20 1126    Specimen:  Urine, Clean Catch Updated:  03/02/20 1205     Color, UA Yellow     Appearance, UA Cloudy     Comment: Result checked         pH, UA 6.0     Specific Gravity, UA 1.026     Glucose, UA Negative     Ketones, UA Negative     Bilirubin, UA Negative     Blood, UA Negative     Protein, UA 30 mg/dL (1+)     Leuk Esterase, UA Small (1+)     Nitrite, UA Negative     Urobilinogen, UA 1.0 E.U./dL    Urinalysis, Microscopic Only - Urine, Clean Catch [[id number]]  (Abnormal) Collected:  03/02/20 1126    Specimen:  Urine, Clean Catch Updated:  03/02/20 1205     RBC, UA 6-12 /HPF      WBC, UA 21-30 /HPF      Bacteria, UA Trace /HPF      Squamous Epithelial Cells, UA 7-12 /HPF      Hyaline Casts, UA 3-6 /LPF      Methodology Automated Microscopy    Urine Culture - Urine, Urine, Clean Catch [[id number]] Collected:  03/02/20 1126    Specimen:  Urine, Clean Catch Updated:  03/02/20 1205    CBC & Differential [[id number]] Collected:  03/02/20 0747    Specimen:  Blood Updated:  03/02/20 0924    Narrative:       The following orders were created for panel order CBC & Differential.  Procedure                               Abnormality         Status                     ---------                               -----------          ------                     Manual Differential[[id number]]          Abnormal            Final result               CBC Auto Differential[[id number]]        Abnormal            Final result                 Please view results for these tests on the individual orders.    Manual Differential [[id number]]  (Abnormal) Collected:  03/02/20 0747    Specimen:  Blood Updated:  03/02/20 0924     Neutrophil % 81.0 %      Lymphocyte % 6.0 %      Monocyte % 4.0 %      Bands %  9.0 %      Neutrophils Absolute 15.21 10*3/mm3      Lymphocytes Absolute 1.01 10*3/mm3      Monocytes Absolute 0.68 10*3/mm3      RBC Morphology Normal     WBC Morphology Normal     Platelet Morphology Normal    Comprehensive Metabolic Panel [[id number]]  (Abnormal) Collected:  03/02/20 0747    Specimen:  Blood Updated:  03/02/20 0912     Glucose 89 mg/dL      BUN 8 mg/dL      Creatinine 0.61 mg/dL      Sodium 135 mmol/L      Potassium 3.8 mmol/L      Chloride 101 mmol/L      CO2 22.0 mmol/L      Calcium 8.1 mg/dL      Total Protein 6.0 g/dL      Albumin 2.70 g/dL      ALT (SGPT) 8 U/L      AST (SGOT) 11 U/L      Alkaline Phosphatase 98 U/L      Total Bilirubin 0.3 mg/dL      eGFR  African Amer 149 mL/min/1.73      Globulin 3.3 gm/dL      A/G Ratio 0.8 g/dL      BUN/Creatinine Ratio 13.1     Anion Gap 12.0 mmol/L     Narrative:       GFR Normal >60  Chronic Kidney Disease <60  Kidney Failure <15      CBC Auto Differential [[id number]]  (Abnormal) Collected:  03/02/20 0747    Specimen:  Blood Updated:  03/02/20 0902     WBC 16.90 10*3/mm3      RBC 3.19 10*6/mm3      Hemoglobin 9.6 g/dL      Hematocrit 29.7 %      MCV 93.0 fL      MCH 30.1 pg      MCHC 32.3 g/dL      RDW 12.9 %      RDW-SD 42.4 fl      MPV 8.5 fL      Platelets 238 10*3/mm3     Influenza Antigen, Rapid - Swab, Nasopharynx [[id number]]  (Normal) Collected:  03/01/20 1655    Specimen:  Swab from Nasopharynx Updated:  03/01/20 1110     Influenza A PCR Not Detected     Influenza B PCR Not  Detected    Blood Culture - Blood, Arm, Left [[id number]] Collected:  02/29/20 1710    Specimen:  Blood from Arm, Left Updated:  03/01/20 1730     Blood Culture No growth at 24 hours        No results found for: HGBA1C            Lab Results   Component Value Date    LIPASE 6 (L) 03/01/2020     Lab Results   Component Value Date    CHOL 147 11/10/2017    TRIG 93 11/10/2017    HDL 38 (L) 11/10/2017     (H) 11/10/2017       No results found for: INTRAOP, PREDX, FINALDX, COMDX    Microbiology Results (last 10 days)     Procedure Component Value - Date/Time    Influenza Antigen, Rapid - Swab, Nasopharynx [[id number]]  (Normal) Collected:  03/01/20 1655    Lab Status:  Final result Specimen:  Swab from Nasopharynx Updated:  03/01/20 1733     Influenza A PCR Not Detected     Influenza B PCR Not Detected    Blood Culture - Blood, Arm, Left [[id number]] Collected:  02/29/20 1710    Lab Status:  Preliminary result Specimen:  Blood from Arm, Left Updated:  03/01/20 1730     Blood Culture No growth at 24 hours    Blood Culture - Blood, Arm, Right [[id number]] Collected:  02/29/20 1710    Lab Status:  Preliminary result Specimen:  Blood from Arm, Right Updated:  03/02/20 1715     Blood Culture No growth at 2 days          ECG/EMG Results (most recent)     Procedure Component Value Units Date/Time    ECG 12 Lead [[id number]] Collected:  03/01/20 0729     Updated:  03/01/20 0731    Narrative:       HEART RATE= 93  bpm  RR Interval= 648  ms  CO Interval= 136  ms  P Horizontal Axis= -6  deg  P Front Axis= 83  deg  QRSD Interval= 77  ms  QT Interval= 349  ms  QRS Axis= 56  deg  T Wave Axis= 23  deg  - NORMAL ECG -  Sinus rhythm  Electronically Signed By:   Date and Time of Study: 2020-03-01 07:29:55                    Ct Abdomen Pelvis With Contrast    Result Date: 3/1/2020   1. Interval change from yesterday's CT report, outside images are not available, interval worsening 2. Changes are consistent with ileus with change of  enteritis with small bowel wall focal thickening in several mid small bowel loops-new from yesterday's exam 3. Development of abdominal and pelvic ascites, mild in degree-new from yesterday's report 4. Lung bases demonstrate mild atelectasis, new from prior study 5. no evidence of free air, adenopathy or mass or vascular abnormality  Electronically Signed By-Sumeet Mott Jr. On:3/1/2020 2:00 PM This report was finalized on [id number] by  Sumeet Mott Jr., .    Xr Chest 1 View    Result Date: 3/1/2020   1. No active cardiopulmonary disease 2. No interval change from 05/27/2017  Electronically Signed By-Sumeet Mott Jr. On:3/1/2020 12:20 PM This report was finalized on [id number] by  Sumeet Mott Jr., .    Xr Abdomen Kub    Result Date: 3/1/2020   1. Nonspecific bowel pattern 2. No evidence of free air  Electronically Signed By-Sumeet Mott Jr. On:3/1/2020 12:21 PM This report was finalized on [id number] by  Sumeet Mott Jr., .          Xrays, labs reviewed personally by physician.    Medication Review:   I have reviewed the patient's current medication list      Scheduled Meds    ketorolac 30 mg Intravenous Once   nicotine 1 patch Transdermal Q24H   pantoprazole 40 mg Intravenous Q12H   piperacillin-tazobactam 3.375 g Intravenous Q8H   sodium chloride 10 mL Intravenous Q12H       Meds Infusions    Pharmacy to Dose Zosyn     sodium chloride 100 mL/hr Last Rate: 100 mL/hr (03/02/20 1008)       Meds PRN  •  acetaminophen  •  HYDROcodone-acetaminophen  •  ketorolac  •  ondansetron  •  Pharmacy to Dose Zosyn  •  [COMPLETED] Insert peripheral IV **AND** sodium chloride  •  sodium chloride    I personally reviewed patient's x-ray film and all other relevant data                    sAssessment/Plan        Assessment/Plan     Active Hospital Problems    Diagnosis  POA   • Ileus [K56.7]  Yes     Priority: High   • Abdominal pain [R10.9]  Yes     Priority: High   • Sepsis without acute organ dysfunction (CMS/HCC) [A41.9]   Yes     Priority: High   • Substance abuse (CMS/HCC) [F19.10]  Yes     Priority: Medium   • Leukocytosis [D72.829]  Yes     Priority: Medium      Resolved Hospital Problems   No resolved problems to display.       MEDICAL DECISION MAKING COMPLEXITY BY PROBLEM:       Acute abdominal pain with suspected intra-abdominal infection/PID/enteritis and patient has high white count.  Patient will be given broad-spectrum IV antibiotics Zosyn and patient has been seen by general surgery as well as OB/GYN.  We will continue Toradol/I will change narcotics to p.o. tramadol.  Patient will be given IV fluids and I am repeating labs tomorrow morning.        Substance abuse, tobacco, methamphetamine, marijuana: Encourage smoking cessation, methamphetamine cessation, marijuana cessation; add nicotine patch        VTE Prophylaxis -SCD  Hold off on Lovenox      GI prophylaxis with Protonix    Mechanical Order History:     None      Pharmalogical Order History:     Ordered     Dose Route Frequency Stop    02/29/20 1940  enoxaparin (LOVENOX) syringe 40 mg      40 mg SC Every 24 Hours Scheduled --            Code Status -   Code Status and Medical Interventions:   Ordered at: 02/29/20 1940     Code Status:    CPR     Medical Interventions (Level of Support Prior to Arrest):    Full       Discharge Planning          Destination      Coordination has not been started for this encounter.      Durable Medical Equipment      Coordination has not been started for this encounter.      Dialysis/Infusion      Coordination has not been started for this encounter.      Home Medical Care      Coordination has not been started for this encounter.      Therapy      Coordination has not been started for this encounter.      Community Resources      Coordination has not been started for this encounter.            Electronically signed by Deon Crisostomo MD, 03/02/20, 5:17 PM.  [facility] Hospitalist Team

## 2020-03-02 NOTE — PROGRESS NOTES
GENERAL SURGERY PROGRESS NOTE    3/2/2020   LOS: 0 days   Patient Care Team:  Asia Covarrubias MD as PCP - General    Chief Complaint: abdominal pain    Subjective   HPI: Patient is a 24 y.o. female presents with lower abdominal pain, and failure to have a bowel movement or passed flatus over the last 3 days.  The patient reports that she was seen at [facility] earlier this week where she was told she was pregnant.  She then went to [facility] approximately 3 days ago again, where she was told she was pregnant and instructed to take prenatal vitamins.  Prior to presenting to these 2 hospitals she was engaging in unprotected sex with multiple partners.  She reports that 3 days ago she started to develop lower abdominal pain bilaterally which radiated up into her epigastrium and chest.  The pain was not associated with nausea, vomiting, diarrhea, but was associated with constipation.  Yesterday, her pain became so severe that she presented to [facility] where she was seen and evaluated in the emergency room.  There, her labs were notable for a negative beta hCG, elevated white blood cell count of 21,000, a CT scan of the abdomen and pelvis was performed that demonstrated ileus without other intra-abdominal findings, and a pelvic ultrasound which did not demonstrate any intrauterine pregnancy or findings of pelvic inflammatory disease.  The patient's past medical history is significant for methamphetamine use, tobacco abuse, marijuana use, and 1 live birth with 3 miscarriages.  The patient's 1 live birth was a  performed in May 2018.  She is otherwise had no intra-abdominal surgeries.  The patient presented to our emergency room yesterday, and was admitted to the hospital for further work-up and evaluation.    Interval History:   Reports feeling better with abx and abd binder. Having chills and sweats. Mild nausea without emesis    Objective     Vital  Signs  Temp:  [98.7 °F (37.1 °C)-101.2 °F (38.4 °C)] 99.9 °F (37.7 °C)  Heart Rate:  [102-120] 108  Resp:  [16-22] 22  BP: ()/(63-78) 118/78    Physical Exam   Constitutional: She is oriented to person, place, and time. She appears well-developed and well-nourished.   HENT:   Head: Normocephalic and atraumatic.   Eyes: Pupils are equal, round, and reactive to light. EOM are normal.   Neck: Normal range of motion. Neck supple.   Cardiovascular: Normal rate and regular rhythm.   Pulmonary/Chest: Effort normal and breath sounds normal.   Abdominal: Soft.    to palpation bilateral lower quadrants, no evidence of peritonitis.   Musculoskeletal: Normal range of motion. She exhibits edema.   Neurological: She is alert and oriented to person, place, and time.   Skin: Skin is warm and dry. No erythema.   Psychiatric: She has a normal mood and affect. Her behavior is normal.   Vitals reviewed.       Results Review:    Lab Results (last 24 hours)     Procedure Component Value Units Date/Time    Blood Culture - Blood, Arm, Left [[id number]] Collected:  02/29/20 1710    Specimen:  Blood from Arm, Left Updated:  03/02/20 1730     Blood Culture No growth at 2 days    Blood Culture - Blood, Arm, Right [[id number]] Collected:  02/29/20 1710    Specimen:  Blood from Arm, Right Updated:  03/02/20 1715     Blood Culture No growth at 2 days    Urinalysis With Culture If Indicated - Urine, Clean Catch [[id number]]  (Abnormal) Collected:  03/02/20 1126    Specimen:  Urine, Clean Catch Updated:  03/02/20 1205     Color, UA Yellow     Appearance, UA Cloudy     Comment: Result checked         pH, UA 6.0     Specific Gravity, UA 1.026     Glucose, UA Negative     Ketones, UA Negative     Bilirubin, UA Negative     Blood, UA Negative     Protein, UA 30 mg/dL (1+)     Leuk Esterase, UA Small (1+)     Nitrite, UA Negative     Urobilinogen, UA 1.0 E.U./dL    Urinalysis, Microscopic Only - Urine, Clean Catch [[id number]]  (Abnormal)  Collected:  03/02/20 1126    Specimen:  Urine, Clean Catch Updated:  03/02/20 1205     RBC, UA 6-12 /HPF      WBC, UA 21-30 /HPF      Bacteria, UA Trace /HPF      Squamous Epithelial Cells, UA 7-12 /HPF      Hyaline Casts, UA 3-6 /LPF      Methodology Automated Microscopy    Urine Culture - Urine, Urine, Clean Catch [[id number]] Collected:  03/02/20 1126    Specimen:  Urine, Clean Catch Updated:  03/02/20 1205    CBC & Differential [[id number]] Collected:  03/02/20 0747    Specimen:  Blood Updated:  03/02/20 0924    Narrative:       The following orders were created for panel order CBC & Differential.  Procedure                               Abnormality         Status                     ---------                               -----------         ------                     Manual Differential[[id number]]          Abnormal            Final result               CBC Auto Differential[[id number]]        Abnormal            Final result                 Please view results for these tests on the individual orders.    Manual Differential [[id number]]  (Abnormal) Collected:  03/02/20 0747    Specimen:  Blood Updated:  03/02/20 0924     Neutrophil % 81.0 %      Lymphocyte % 6.0 %      Monocyte % 4.0 %      Bands %  9.0 %      Neutrophils Absolute 15.21 10*3/mm3      Lymphocytes Absolute 1.01 10*3/mm3      Monocytes Absolute 0.68 10*3/mm3      RBC Morphology Normal     WBC Morphology Normal     Platelet Morphology Normal    Comprehensive Metabolic Panel [[id number]]  (Abnormal) Collected:  03/02/20 0747    Specimen:  Blood Updated:  03/02/20 0912     Glucose 89 mg/dL      BUN 8 mg/dL      Creatinine 0.61 mg/dL      Sodium 135 mmol/L      Potassium 3.8 mmol/L      Chloride 101 mmol/L      CO2 22.0 mmol/L      Calcium 8.1 mg/dL      Total Protein 6.0 g/dL      Albumin 2.70 g/dL      ALT (SGPT) 8 U/L      AST (SGOT) 11 U/L      Alkaline Phosphatase 98 U/L      Total Bilirubin 0.3 mg/dL      eGFR  African Amer 149 mL/min/1.73       Globulin 3.3 gm/dL      A/G Ratio 0.8 g/dL      BUN/Creatinine Ratio 13.1     Anion Gap 12.0 mmol/L     Narrative:       GFR Normal >60  Chronic Kidney Disease <60  Kidney Failure <15      CBC Auto Differential [[id number]]  (Abnormal) Collected:  03/02/20 0747    Specimen:  Blood Updated:  03/02/20 0902     WBC 16.90 10*3/mm3      RBC 3.19 10*6/mm3      Hemoglobin 9.6 g/dL      Hematocrit 29.7 %      MCV 93.0 fL      MCH 30.1 pg      MCHC 32.3 g/dL      RDW 12.9 %      RDW-SD 42.4 fl      MPV 8.5 fL      Platelets 238 10*3/mm3         Imaging Results (Last 24 Hours)     ** No results found for the last 24 hours. **           I have reviewed the above results and noted them below    Medication Review:    Current Facility-Administered Medications:   •  acetaminophen (TYLENOL) tablet 650 mg, 650 mg, Oral, Q6H PRN, Aruna Khan FNP  •  HYDROcodone-acetaminophen (NORCO) 7.5-325 MG per tablet 1 tablet, 1 tablet, Oral, Q6H PRN, Aruna Khan FNP, 1 tablet at 03/02/20 1229  •  ketorolac (TORADOL) injection 15 mg, 15 mg, Intravenous, Q6H PRN, Aruna Khan FNP, 15 mg at 03/02/20 1613  •  ketorolac (TORADOL) injection 30 mg, 30 mg, Intravenous, Once, Dav Lou MD  •  nicotine (NICODERM CQ) 21 MG/24HR patch 1 patch, 1 patch, Transdermal, Q24H, Aruna Khan FNP  •  ondansetron (ZOFRAN) injection 4 mg, 4 mg, Intravenous, Q6H PRN, Aruna Khan FNP  •  pantoprazole (PROTONIX) injection 40 mg, 40 mg, Intravenous, Q12H, Dav Lou MD, 40 mg at 03/02/20 0949  •  Pharmacy to Dose Zosyn, , Does not apply, Continuous PRN, Dav Lou MD  •  piperacillin-tazobactam (ZOSYN) IVPB 3.375 g in 100 mL NS (CD), 3.375 g, Intravenous, Q8H, Carmine, Dav Lo MD, Last Rate: 25 mL/hr at 03/02/20 1229, 3.375 g at 03/02/20 1229  •  [COMPLETED] Insert peripheral IV, , , Once **AND** sodium chloride 0.9 % flush 10 mL, 10 mL, Intravenous, PRN, Skyler Murray MD  •   sodium chloride 0.9 % flush 10 mL, 10 mL, Intravenous, Q12H, Aruna Khan, SHELLIEP, 10 mL at 03/01/20 2041  •  sodium chloride 0.9 % flush 10 mL, 10 mL, Intravenous, PRN, Aruna Khan, FNP  •  sodium chloride 0.9 % infusion, 100 mL/hr, Intravenous, Continuous, Aruna Khan, SHELLIEP, Last Rate: 100 mL/hr at 03/02/20 1008, 100 mL/hr at 03/02/20 1008    Assessment/Plan     Active Problems:    Abdominal pain    Sepsis without acute organ dysfunction (CMS/HCC)    Ileus    Substance abuse (CMS/HCC)    Leukocytosis    Patient with lower abdominal pain right greater than left of unclear etiology.  At the outside hospital, the patient had a CT scan which per report clearly identified the appendix and was normal.  However, repeat CT scan performed here demonstrated significant amount of fluid in the pelvis along with inflamed loops of small bowel in the right lower quadrant concerning for enteritis.  The appendix was difficult to visualize.  Regardless, white blood cell count is decreasing on antibiotics and the patient is clinically improving.  I discussed with the patient that if this is appendicitis, my preference would be to treat her with antibiotics until her symptoms resolve, and if she is still having lower abdominal pain in approximately 2 months then we could proceed with an interval appendectomy at that time.  I am still awaiting the results of her GC and Chlamydia testing.  PID is still high my differential for the etiology of her abdominal pain given her symptoms, her urinalysis which demonstrated significant pyuria without nitrates, and history of unprotected sex with multiple partners.  Continue treatment with antibiotics  Okay for diet from surgical standpoint  We will continue to follow.    Javon Hammond MD  03/02/20  6:22 PM

## 2020-03-02 NOTE — PLAN OF CARE
Problem: Patient Care Overview  Goal: Plan of Care Review  Outcome: Ongoing (interventions implemented as appropriate)  Flowsheets (Taken 3/2/2020 3160)  Progress: no change  Note:   Requiring pain medication when due.  Patient does seem a little more comfortable than when I first saw her at 11.  Taking Norco and Toradol with some pain relief.  Gets up to bathroom without problems and voiding.

## 2020-03-02 NOTE — PROGRESS NOTES
"Discharge Planning Assessment   [facility]     Patient Name: Stephanie Nath  MRN: [id number]  Today's Date: 3/2/2020    Admit Date: 2/29/2020    Discharge Needs Assessment     Row Name 03/02/20 0906       Living Environment    Lives With  other (see comments) patient states she lives with someone but did not elaborate.     Current Living Arrangements  home/apartment/condo    Primary Care Provided by  self    Provides Primary Care For  no one    Family Caregiver if Needed  none    Able to Return to Prior Arrangements  yes       Resource/Environmental Concerns    Resource/Environmental Concerns  none    Transportation Concerns  car, none       Transition Planning    Patient/Family Anticipates Transition to  home    Patient/Family Anticipated Services at Transition  none    Transportation Anticipated  family or friend will provide       Discharge Needs Assessment    Readmission Within the Last 30 Days  no previous admission in last 30 days    Concerns to be Addressed  no discharge needs identified;denies needs/concerns at this time    Equipment Currently Used at Home  none    Anticipated Changes Related to Illness  none    Equipment Needed After Discharge  none        Discharge Plan     Row Name 03/02/20 0907       Plan    Plan  DC Plan: anticipate home     Patient/Family in Agreement with Plan  yes    Plan Comments  Spoke with patient at bedside.  Patient not engaged in conversation with .  She stated she lives with \"someone\" but would not elaborate who.  Reports she drives.  Denies use of DME.  Denies taking any medications.  Denies any needs at this time.  Also spoke to primary RN and she stated she will call CMif any needs arise   PCP: Satish           Demographic Summary     Row Name 03/02/20 0905       General Information    Admission Type  observation    Arrived From  home    Referral Source  admission list    Reason for Consult  discharge planning    Preferred Language  English        Functional Status "     Row Name 03/02/20 0906       Functional Status    Usual Activity Tolerance  good    Current Activity Tolerance  good       Functional Status, IADL    Medications  independent    Meal Preparation  independent    Housekeeping  independent    Laundry  independent    Shopping  independent       Mental Status    General Appearance WDL  WDL       Mental Status Summary    Recent Changes in Mental Status/Cognitive Functioning  no changes        Jessica Delarosa,RN    /Utilization Review  [place] [phone] office  [phone] fax  [email or web address]  [facility] NPI:   Hospital Tax ID: [id number]

## 2020-03-03 VITALS
SYSTOLIC BLOOD PRESSURE: 118 MMHG | BODY MASS INDEX: 33.93 KG/M2 | OXYGEN SATURATION: 98 % | HEART RATE: 85 BPM | HEIGHT: 62 IN | DIASTOLIC BLOOD PRESSURE: 75 MMHG | RESPIRATION RATE: 17 BRPM | TEMPERATURE: 98.5 F | WEIGHT: 184.4 LBS

## 2020-03-03 PROBLEM — D72.829 LEUKOCYTOSIS: Status: RESOLVED | Noted: 2020-03-01 | Resolved: 2020-03-03

## 2020-03-03 PROBLEM — R10.9 ABDOMINAL PAIN: Status: RESOLVED | Noted: 2020-02-29 | Resolved: 2020-03-03

## 2020-03-03 PROBLEM — N73.0 PID (ACUTE PELVIC INFLAMMATORY DISEASE): Status: ACTIVE | Noted: 2020-03-03

## 2020-03-03 PROBLEM — K56.7 ILEUS (HCC): Status: RESOLVED | Noted: 2020-03-01 | Resolved: 2020-03-03

## 2020-03-03 PROBLEM — A41.9 SEPSIS WITHOUT ACUTE ORGAN DYSFUNCTION: Status: RESOLVED | Noted: 2020-02-29 | Resolved: 2020-03-03

## 2020-03-03 LAB
ALBUMIN SERPL-MCNC: 2.6 G/DL (ref 3.5–5.2)
ALBUMIN/GLOB SERPL: 0.8 G/DL
ALP SERPL-CCNC: 68 U/L (ref 39–117)
ALT SERPL W P-5'-P-CCNC: 9 U/L (ref 1–33)
ANION GAP SERPL CALCULATED.3IONS-SCNC: 12 MMOL/L (ref 5–15)
AST SERPL-CCNC: 12 U/L (ref 1–32)
BACTERIA SPEC AEROBE CULT: NO GROWTH
BASOPHILS # BLD AUTO: 0 10*3/MM3 (ref 0–0.2)
BASOPHILS NFR BLD AUTO: 0.2 % (ref 0–1.5)
BILIRUB SERPL-MCNC: 0.2 MG/DL (ref 0.2–1.2)
BUN BLD-MCNC: 5 MG/DL (ref 6–20)
BUN/CREAT SERPL: 8.3 (ref 7–25)
C TRACH RRNA CVX QL NAA+PROBE: NOT DETECTED
C TRACH RRNA SPEC DONR QL NAA+PROBE: NORMAL
CALCIUM SPEC-SCNC: 7.9 MG/DL (ref 8.6–10.5)
CHLORIDE SERPL-SCNC: 103 MMOL/L (ref 98–107)
CHOLEST SERPL-MCNC: 76 MG/DL (ref 0–200)
CK SERPL-CCNC: 30 U/L (ref 20–180)
CO2 SERPL-SCNC: 24 MMOL/L (ref 22–29)
CREAT BLD-MCNC: 0.6 MG/DL (ref 0.57–1)
CRP SERPL-MCNC: 27.13 MG/DL (ref 0–0.5)
DEPRECATED RDW RBC AUTO: 42 FL (ref 37–54)
EOSINOPHIL # BLD AUTO: 0.2 10*3/MM3 (ref 0–0.4)
EOSINOPHIL NFR BLD AUTO: 1.1 % (ref 0.3–6.2)
ERYTHROCYTE [DISTWIDTH] IN BLOOD BY AUTOMATED COUNT: 13.1 % (ref 12.3–15.4)
ERYTHROCYTE [SEDIMENTATION RATE] IN BLOOD: 117 MM/HR (ref 0–20)
FERRITIN SERPL-MCNC: 172.3 NG/ML (ref 13–150)
GFR SERPL CREATININE-BSD FRML MDRD: >150 ML/MIN/1.73
GLOBULIN UR ELPH-MCNC: 3.4 GM/DL
GLUCOSE BLD-MCNC: 96 MG/DL (ref 65–99)
HBA1C MFR BLD: 4.9 % (ref 3.5–5.6)
HCG INTACT+B SERPL-ACNC: <1 MIU/ML
HCT VFR BLD AUTO: 28.7 % (ref 34–46.6)
HDLC SERPL-MCNC: 20 MG/DL (ref 40–60)
HGB BLD-MCNC: 9.7 G/DL (ref 12–15.9)
LDLC SERPL CALC-MCNC: 43 MG/DL (ref 0–100)
LDLC/HDLC SERPL: 2.14 {RATIO}
LYMPHOCYTES # BLD AUTO: 1.1 10*3/MM3 (ref 0.7–3.1)
LYMPHOCYTES NFR BLD AUTO: 7.4 % (ref 19.6–45.3)
MAGNESIUM SERPL-MCNC: 1.9 MG/DL (ref 1.6–2.6)
MCH RBC QN AUTO: 30.6 PG (ref 26.6–33)
MCHC RBC AUTO-ENTMCNC: 33.6 G/DL (ref 31.5–35.7)
MCV RBC AUTO: 90.8 FL (ref 79–97)
MONOCYTES # BLD AUTO: 0.8 10*3/MM3 (ref 0.1–0.9)
MONOCYTES NFR BLD AUTO: 5.4 % (ref 5–12)
N GONORRHOEA DNA SPEC QL NAA+PROBE: NORMAL
N GONORRHOEA RRNA SPEC QL NAA+PROBE: DETECTED
NEUTROPHILS # BLD AUTO: 12.6 10*3/MM3 (ref 1.7–7)
NEUTROPHILS NFR BLD AUTO: 85.9 % (ref 42.7–76)
NRBC BLD AUTO-RTO: 0 /100 WBC (ref 0–0.2)
PHOSPHATE SERPL-MCNC: 3.1 MG/DL (ref 2.5–4.5)
PLATELET # BLD AUTO: 274 10*3/MM3 (ref 140–450)
PMV BLD AUTO: 8.7 FL (ref 6–12)
POTASSIUM BLD-SCNC: 3.4 MMOL/L (ref 3.5–5.2)
PROT SERPL-MCNC: 6 G/DL (ref 6–8.5)
RBC # BLD AUTO: 3.16 10*6/MM3 (ref 3.77–5.28)
SODIUM BLD-SCNC: 139 MMOL/L (ref 136–145)
TRIGL SERPL-MCNC: 66 MG/DL (ref 0–150)
TROPONIN T SERPL-MCNC: <0.01 NG/ML (ref 0–0.03)
TSH SERPL DL<=0.05 MIU/L-ACNC: 2.38 UIU/ML (ref 0.27–4.2)
URATE SERPL-MCNC: 2 MG/DL (ref 2.4–5.7)
VIT B12 BLD-MCNC: 1140 PG/ML (ref 211–946)
VLDLC SERPL-MCNC: 13.2 MG/DL
WBC NRBC COR # BLD: 14.7 10*3/MM3 (ref 3.4–10.8)

## 2020-03-03 PROCEDURE — 99239 HOSP IP/OBS DSCHRG MGMT >30: CPT | Performed by: INTERNAL MEDICINE

## 2020-03-03 PROCEDURE — 85025 COMPLETE CBC W/AUTO DIFF WBC: CPT | Performed by: INTERNAL MEDICINE

## 2020-03-03 PROCEDURE — 99231 SBSQ HOSP IP/OBS SF/LOW 25: CPT | Performed by: SURGERY

## 2020-03-03 PROCEDURE — 25010000002 PIPERACILLIN SOD-TAZOBACTAM PER 1 G: Performed by: INTERNAL MEDICINE

## 2020-03-03 PROCEDURE — 82550 ASSAY OF CK (CPK): CPT | Performed by: INTERNAL MEDICINE

## 2020-03-03 PROCEDURE — 82607 VITAMIN B-12: CPT | Performed by: INTERNAL MEDICINE

## 2020-03-03 PROCEDURE — 83735 ASSAY OF MAGNESIUM: CPT | Performed by: INTERNAL MEDICINE

## 2020-03-03 PROCEDURE — 25010000002 KETOROLAC TROMETHAMINE PER 15 MG: Performed by: NURSE PRACTITIONER

## 2020-03-03 PROCEDURE — 84443 ASSAY THYROID STIM HORMONE: CPT | Performed by: INTERNAL MEDICINE

## 2020-03-03 PROCEDURE — 84484 ASSAY OF TROPONIN QUANT: CPT | Performed by: INTERNAL MEDICINE

## 2020-03-03 PROCEDURE — 84550 ASSAY OF BLOOD/URIC ACID: CPT | Performed by: INTERNAL MEDICINE

## 2020-03-03 PROCEDURE — 82728 ASSAY OF FERRITIN: CPT | Performed by: INTERNAL MEDICINE

## 2020-03-03 PROCEDURE — 84100 ASSAY OF PHOSPHORUS: CPT | Performed by: INTERNAL MEDICINE

## 2020-03-03 PROCEDURE — 80053 COMPREHEN METABOLIC PANEL: CPT | Performed by: INTERNAL MEDICINE

## 2020-03-03 PROCEDURE — 80061 LIPID PANEL: CPT | Performed by: INTERNAL MEDICINE

## 2020-03-03 PROCEDURE — 25010000002 CEFTRIAXONE PER 250 MG: Performed by: OBSTETRICS & GYNECOLOGY

## 2020-03-03 PROCEDURE — 85652 RBC SED RATE AUTOMATED: CPT | Performed by: INTERNAL MEDICINE

## 2020-03-03 PROCEDURE — 83036 HEMOGLOBIN GLYCOSYLATED A1C: CPT | Performed by: INTERNAL MEDICINE

## 2020-03-03 PROCEDURE — 86140 C-REACTIVE PROTEIN: CPT | Performed by: INTERNAL MEDICINE

## 2020-03-03 RX ORDER — DOXYCYCLINE 50 MG/1
100 CAPSULE ORAL 2 TIMES DAILY
Qty: 20 CAPSULE | Refills: 0 | Status: SHIPPED | OUTPATIENT
Start: 2020-03-03 | End: 2020-03-17

## 2020-03-03 RX ORDER — METRONIDAZOLE 500 MG/1
500 TABLET ORAL 3 TIMES DAILY
Qty: 42 TABLET | Refills: 0 | Status: SHIPPED | OUTPATIENT
Start: 2020-03-03 | End: 2020-03-17

## 2020-03-03 RX ORDER — CEFDINIR 300 MG/1
300 CAPSULE ORAL 2 TIMES DAILY
Qty: 28 CAPSULE | Refills: 0 | Status: SHIPPED | OUTPATIENT
Start: 2020-03-03 | End: 2020-03-17

## 2020-03-03 RX ORDER — KETOROLAC TROMETHAMINE 10 MG/1
10 TABLET, FILM COATED ORAL EVERY 6 HOURS PRN
Qty: 20 TABLET | Refills: 0 | Status: SHIPPED | OUTPATIENT
Start: 2020-03-03 | End: 2020-03-08

## 2020-03-03 RX ADMIN — ACETAMINOPHEN 650 MG: 325 TABLET, FILM COATED ORAL at 17:01

## 2020-03-03 RX ADMIN — PIPERACILLIN AND TAZOBACTAM 3.38 G: 3; .375 INJECTION, POWDER, FOR SOLUTION INTRAVENOUS at 14:38

## 2020-03-03 RX ADMIN — TRAMADOL HYDROCHLORIDE 50 MG: 50 TABLET, FILM COATED ORAL at 09:23

## 2020-03-03 RX ADMIN — PANTOPRAZOLE SODIUM 40 MG: 40 INJECTION, POWDER, FOR SOLUTION INTRAVENOUS at 09:23

## 2020-03-03 RX ADMIN — TRAMADOL HYDROCHLORIDE 50 MG: 50 TABLET, FILM COATED ORAL at 17:01

## 2020-03-03 RX ADMIN — SODIUM CHLORIDE 100 ML/HR: 900 INJECTION, SOLUTION INTRAVENOUS at 05:12

## 2020-03-03 RX ADMIN — Medication 10 ML: at 09:29

## 2020-03-03 RX ADMIN — KETOROLAC TROMETHAMINE 15 MG: 15 INJECTION, SOLUTION INTRAMUSCULAR; INTRAVENOUS at 05:14

## 2020-03-03 RX ADMIN — PIPERACILLIN AND TAZOBACTAM 3.38 G: 3; .375 INJECTION, POWDER, FOR SOLUTION INTRAVENOUS at 05:14

## 2020-03-03 RX ADMIN — ACETAMINOPHEN 650 MG: 325 TABLET, FILM COATED ORAL at 09:23

## 2020-03-03 RX ADMIN — KETOROLAC TROMETHAMINE 15 MG: 15 INJECTION, SOLUTION INTRAMUSCULAR; INTRAVENOUS at 13:20

## 2020-03-03 RX ADMIN — NICOTINE 1 PATCH: 21 PATCH TRANSDERMAL at 17:00

## 2020-03-03 RX ADMIN — TRAMADOL HYDROCHLORIDE 50 MG: 50 TABLET, FILM COATED ORAL at 02:56

## 2020-03-03 RX ADMIN — CEFTRIAXONE SODIUM 1 G: 1 INJECTION, POWDER, FOR SOLUTION INTRAMUSCULAR; INTRAVENOUS at 13:19

## 2020-03-03 NOTE — PROGRESS NOTES
LOS: 1 day   Patient Care Team:  Asia Covarrubias MD as PCP - General        Subjective     Pt feeling better today.  Ambulating, tolerating po well, pain improved.  WBC decreased to 14K, Tm 101.2 on 3/1, 2244, afebrile since.  Spoke with lab re: delay on GC/Chl which was done on Khurram 3/1, still was pending this am, apparently was sent to [place], urine GC/ Chl testing done today + for gonorrhea.      Objective     Vital Signs  Vitals:    03/02/20 2345 03/03/20 0255 03/03/20 0931 03/03/20 1130   BP: 110/70 110/72 132/86 116/84   BP Location: Left arm Left arm  Right arm   Patient Position: Lying Lying Sitting Lying   Pulse: 99 104 106 96   Resp: 16 16 18 16   Temp: 98.4 °F (36.9 °C) 99.1 °F (37.3 °C) 98.5 °F (36.9 °C) 98.4 °F (36.9 °C)   TempSrc: Oral Oral Oral Oral   SpO2:  95% 99% 99%   Weight:       Height:           Physical Exam:     General Appearance:    Alert, cooperative, in no acute distress       Abdomen:     Normal bowel sounds, no masses, no organomegaly, soft        Mildly tender diffusely, non-distended, +voluntary guarding.   Extremities:   Moves all extremities well, no edema, no cyanosis, no             redness        Labs:   Performed at:  [address] [place] [address]  [id number]  : Shana Kumar MD, Phone:  [phone]    Chlamydia trachomatis, Neisseria gonorrhoeae, PCR - Urine, Urine, Clean Catch [[id number]]  (Abnormal) Collected:  03/02/20 0944    Specimen:  Urine, Clean Catch Updated:  03/03/20 1101     Chlamydia DNA by PCR Not Detected     Neisseria gonorrhoeae by PCR Detected    Urine Culture - Urine, Urine, Clean Catch [[id number]]  (Normal) Collected:  03/02/20 1126    Specimen:  Urine, Clean Catch Updated:  03/03/20 0935     Urine Culture No growth    Blood Culture - Blood, Arm, Left [[id number]] Collected:  03/02/20 1801    Specimen:  Blood from Arm, Left Updated:  03/03/20 0630     Blood Culture No growth at less than 24 hours     Blood Culture - Blood, Arm, Right [[id number]] Collected:  03/02/20 1758    Specimen:  Blood from Arm, Right Updated:  03/03/20 0630     Blood Culture No growth at less than 24 hours    CBC & Differential [[id number]] Collected:  03/03/20 0441    Specimen:  Blood Updated:  03/03/20 0522    Narrative:       The following orders were created for panel order CBC & Differential.  Procedure                               Abnormality         Status                     ---------                               -----------         ------                     CBC Auto Differential[[id number]]        Abnormal            Final result                 Please view results for these tests on the individual orders.    CBC Auto Differential [[id number]]  (Abnormal) Collected:  03/03/20 0441    Specimen:  Blood Updated:  03/03/20 0522     WBC 14.70 10*3/mm3      RBC 3.16 10*6/mm3      Hemoglobin 9.7 g/dL      Hematocrit 28.7 %      MCV 90.8 fL      MCH 30.6 pg      MCHC 33.6 g/dL      RDW 13.1 %      RDW-SD 42.0 fl      MPV 8.7 fL      Platelets 274 10*3/mm3      Neutrophil % 85.9 %      Lymphocyte % 7.4 %      Monocyte % 5.4 %      Eosinophil % 1.1 %      Basophil % 0.2 %      Neutrophils, Absolute 12.60 10*3/mm3      Lymphocytes, Absolute 1.10 10*3/mm3      Monocytes, Absolute 0.80 10*3/mm3      Eosinophils, Absolute 0.20 10*3/mm3      Basophils, Absolute 0.00 10*3/mm3      nRBC 0.0 /100 WBC     HCG, B-subunit, Quantitative [[id number]] Collected:  03/01/20 1303    Specimen:  Blood Updated:  03/03/20 0335     HCG Quantitative <1 mIU/mL      Comment:                      Female (Non-pregnant)    0 -     5                              (Postmenopausal)  0 -     8                       Female (Pregnant)                       Weeks of Gestation                               3                6 -    71                               4               10 -   750                               5 008 - 4433                               6               158 - 99217                               7             1887 -982625                               8            83339 -160718                               9            27961 -941840                              10            34778 -901718                              12            20463 -893290                              14            13396 - 55490                              15            42485 - 95648                              16             3097 - 58874                              17             9674 - 30743                              18             6097 - 22901  [facility] ECLIA methodology       Narrative:       Performed at:  [address]  [id number]  : Josh Beasley PhD, Phone:  [phone]    Blood Culture - Blood, Arm, Left [[id number]] Collected:  02/29/20 1710    Specimen:  Blood from Arm, Left Updated:  03/02/20 1730     Blood Culture No growth at 2 days    Blood Culture - Blood, Arm, Right [[id number]] Collected:  02/29/20 1710    Specimen:  Blood from Arm, Right Updated:  03/02/20 1715     Blood Culture No growth at 2 days    Urinalysis With Culture If Indicated - Urine, Clean Catch [[id number]]  (Abnormal) Collected:  03/02/20 1126    Specimen:  Urine, Clean Catch Updated:  03/02/20 1205     Color, UA Yellow     Appearance, UA Cloudy     Comment: Result checked         pH, UA 6.0     Specific Gravity, UA 1.026     Glucose, UA Negative     Ketones, UA Negative     Bilirubin, UA Negative     Blood, UA Negative     Protein, UA 30 mg/dL (1+)     Leuk Esterase, UA Small (1+)     Nitrite, UA Negative     Urobilinogen, UA 1.0 E.U./dL    Urinalysis, Microscopic Only - Urine, Clean Catch [[id number]]  (Abnormal) Collected:  03/02/20 1126    Specimen:  Urine, Clean Catch Updated:  03/02/20 1205     RBC, UA 6-12 /HPF      WBC, UA 21-30 /HPF      Bacteria, UA Trace /HPF      Squamous Epithelial Cells, UA 7-12 /HPF      Hyaline Casts, UA 3-6 /LPF       Methodology Automated Microscopy    CBC & Differential [[id number]] Collected:  03/02/20 0747    Specimen:  Blood Updated:  03/02/20 0924    Narrative:       The following orders were created for panel order CBC & Differential.  Procedure                               Abnormality         Status                     ---------                               -----------         ------                     Manual Differential[[id number]]          Abnormal            Final result               CBC Auto Differential[[id number]]        Abnormal            Final result                 Please view results for these tests on the individual orders.    Manual Differential [[id number]]  (Abnormal) Collected:  03/02/20 0747    Specimen:  Blood Updated:  03/02/20 0924     Neutrophil % 81.0 %      Lymphocyte % 6.0 %      Monocyte % 4.0 %      Bands %  9.0 %      Neutrophils Absolute 15.21 10*3/mm3      Lymphocytes Absolute 1.01 10*3/mm3      Monocytes Absolute 0.68 10*3/mm3      RBC Morphology Normal     WBC Morphology Normal     Platelet Morphology Normal    CBC Auto Differential [[id number]]  (Abnormal) Collected:  03/02/20 0747    Specimen:  Blood Updated:  03/02/20 0902     WBC 16.90 10*3/mm3      RBC 3.19 10*6/mm3      Hemoglobin 9.6 g/dL      Hematocrit 29.7 %      MCV 93.0 fL      MCH 30.1 pg      MCHC 32.3 g/dL      RDW 12.9 %      RDW-SD 42.4 fl      MPV 8.5 fL      Platelets 238 10*3/mm3     Influenza Antigen, Rapid - Swab, Nasopharynx [[id number]]  (Normal) Collected:  03/01/20 1655    Specimen:  Swab from Nasopharynx Updated:  03/01/20 1733     Influenza A PCR Not Detected     Influenza B PCR Not Detected              Assessment/Plan     PID c + gonorrhea.  Is currently on Zosyn and Flagyl which should cover most organisms associated c PID and pt is clinically improving, however, will treat specifically for the gonorrhea c Rocephin, will give 1g d/t clinical situation.      Clinically improving on IV abx, likely can have po  outpatient regimen of Doxycyline 100mg po BID x 14 days and Flagyl 500mg po BID x 14 days and likely stable for d/c either today or tomorrow.      Sunni Alvarado MD  03/03/20  12:31 PM

## 2020-03-03 NOTE — PROGRESS NOTES
GENERAL SURGERY PROGRESS NOTE    3/3/2020   LOS: 1 day   Patient Care Team:  Asia Covarrubias MD as PCP - General    Chief Complaint: abdominal pain    Subjective   HPI: Patient is a 24 y.o. female presents with lower abdominal pain, and failure to have a bowel movement or passed flatus over the last 3 days.  The patient reports that she was seen at [place] [facility] earlier this week where she was told she was pregnant.  She then went to [facility] approximately 3 days ago again, where she was told she was pregnant and instructed to take prenatal vitamins.  Prior to presenting to these 2 hospitals she was engaging in unprotected sex with multiple partners.  She reports that 3 days ago she started to develop lower abdominal pain bilaterally which radiated up into her epigastrium and chest.  The pain was not associated with nausea, vomiting, diarrhea, but was associated with constipation.  Yesterday, her pain became so severe that she presented to [facility] where she was seen and evaluated in the emergency room.  There, her labs were notable for a negative beta hCG, elevated white blood cell count of 21,000, a CT scan of the abdomen and pelvis was performed that demonstrated ileus without other intra-abdominal findings, and a pelvic ultrasound which did not demonstrate any intrauterine pregnancy or findings of pelvic inflammatory disease.  The patient's past medical history is significant for methamphetamine use, tobacco abuse, marijuana use, and 1 live birth with 3 miscarriages.  The patient's 1 live birth was a  performed in May 2018.  She is otherwise had no intra-abdominal surgeries.  The patient presented to our emergency room yesterday, and was admitted to the hospital for further work-up and evaluation.    Interval History:   Continues to report feeling better. No N/V.     Objective     Vital Signs  Temp:  [97.6 °F (36.4 °C)-99.9 °F (37.7 °C)] 98.5 °F  (36.9 °C)  Heart Rate:  [] 106  Resp:  [16-22] 18  BP: (110-132)/(70-86) 132/86    Physical Exam   Constitutional: She is oriented to person, place, and time. She appears well-developed and well-nourished.   HENT:   Head: Normocephalic and atraumatic.   Eyes: Pupils are equal, round, and reactive to light. EOM are normal.   Neck: Normal range of motion. Neck supple.   Cardiovascular: Normal rate and regular rhythm.   Pulmonary/Chest: Effort normal and breath sounds normal.   Abdominal: Soft.    to palpation bilateral lower quadrants, no evidence of peritonitis.   Musculoskeletal: Normal range of motion. She exhibits no edema.   Neurological: She is alert and oriented to person, place, and time.   Skin: Skin is warm and dry. No erythema.   Psychiatric: She has a normal mood and affect. Her behavior is normal.   Vitals reviewed.       Results Review:    Lab Results (last 24 hours)     Procedure Component Value Units Date/Time    Chlamydia trachomatis, Neisseria gonorrhoeae, PCR w/ confirmation - Swab, Vagina [[id number]] Collected:  03/01/20 1615    Specimen:  Swab from Vagina Updated:  03/03/20 1109     Chlamydia trachomatis, PERI --     Comment: No Aptima transport received.        Notified: Nata Cruz 3/3/2020        Neisseria gonorrhoeae, PERI --     Comment: Test not performed       Narrative:       Performed at:  [address]  [id number]  : Shana Kumar MD, Phone:  [phone]    Chlamydia trachomatis, Neisseria gonorrhoeae, PCR - Urine, Urine, Clean Catch [[id number]]  (Abnormal) Collected:  03/02/20 0944    Specimen:  Urine, Clean Catch Updated:  03/03/20 1101     Chlamydia DNA by PCR Not Detected     Neisseria gonorrhoeae by PCR Detected    Urine Culture - Urine, Urine, Clean Catch [[id number]]  (Normal) Collected:  03/02/20 1126    Specimen:  Urine, Clean Catch Updated:  03/03/20 0935     Urine Culture No growth    Blood Culture - Blood,  Arm, Left [[id number]] Collected:  03/02/20 1801    Specimen:  Blood from Arm, Left Updated:  03/03/20 0630     Blood Culture No growth at less than 24 hours    Blood Culture - Blood, Arm, Right [[id number]] Collected:  03/02/20 1758    Specimen:  Blood from Arm, Right Updated:  03/03/20 0630     Blood Culture No growth at less than 24 hours    Sedimentation Rate [[id number]]  (Abnormal) Collected:  03/03/20 0441    Specimen:  Blood Updated:  03/03/20 0608     Sed Rate 117 mm/hr     Ferritin [[id number]]  (Abnormal) Collected:  03/03/20 0441    Specimen:  Blood Updated:  03/03/20 0556     Ferritin 172.30 ng/mL     Narrative:       Results may be falsely decreased if patient taking Biotin.      TSH [[id number]]  (Normal) Collected:  03/03/20 0441    Specimen:  Blood Updated:  03/03/20 0556     TSH 2.380 uIU/mL     Troponin [[id number]]  (Normal) Collected:  03/03/20 0441    Specimen:  Blood Updated:  03/03/20 0556     Troponin T <0.010 ng/mL     Narrative:       Troponin T Reference Range:  <= 0.03 ng/mL-   Negative for AMI  >0.03 ng/mL-     Abnormal for myocardial necrosis.  Clinicians would have to utilize clinical acumen, EKG, Troponin and serial changes to determine if it is an Acute Myocardial Infarction or myocardial injury due to an underlying chronic condition.       Results may be falsely decreased if patient taking Biotin.      Comprehensive Metabolic Panel [[id number]]  (Abnormal) Collected:  03/03/20 0441    Specimen:  Blood Updated:  03/03/20 0552     Glucose 96 mg/dL      BUN 5 mg/dL      Creatinine 0.60 mg/dL      Sodium 139 mmol/L      Potassium 3.4 mmol/L      Chloride 103 mmol/L      CO2 24.0 mmol/L      Calcium 7.9 mg/dL      Total Protein 6.0 g/dL      Albumin 2.60 g/dL      ALT (SGPT) 9 U/L      AST (SGOT) 12 U/L      Alkaline Phosphatase 68 U/L      Total Bilirubin 0.2 mg/dL      eGFR  African Amer >150 mL/min/1.73      Globulin 3.4 gm/dL      A/G Ratio 0.8 g/dL      BUN/Creatinine Ratio 8.3     Anion  Gap 12.0 mmol/L     Narrative:       GFR Normal >60  Chronic Kidney Disease <60  Kidney Failure <15      CK [[id number]]  (Normal) Collected:  03/03/20 0441    Specimen:  Blood Updated:  03/03/20 0552     Creatine Kinase 30 U/L     Uric Acid [[id number]]  (Abnormal) Collected:  03/03/20 0441    Specimen:  Blood Updated:  03/03/20 0552     Uric Acid 2.0 mg/dL     Phosphorus [[id number]]  (Normal) Collected:  03/03/20 0441    Specimen:  Blood Updated:  03/03/20 0552     Phosphorus 3.1 mg/dL     Magnesium [[id number]]  (Normal) Collected:  03/03/20 0441    Specimen:  Blood Updated:  03/03/20 0552     Magnesium 1.9 mg/dL     Lipid Panel [[id number]]  (Abnormal) Collected:  03/03/20 0441    Specimen:  Blood Updated:  03/03/20 0552     Total Cholesterol 76 mg/dL      Triglycerides 66 mg/dL      HDL Cholesterol 20 mg/dL      LDL Cholesterol  43 mg/dL      VLDL Cholesterol 13.2 mg/dL      LDL/HDL Ratio 2.14    Narrative:       Cholesterol Reference Ranges  (U.S. Department of Health and Human Services ATP III Classifications)    Desirable          <200 mg/dL  Borderline High    200-239 mg/dL  High Risk          >240 mg/dL      Triglyceride Reference Ranges  (U.S. Department of Health and Human Services ATP III Classifications)    Normal           <150 mg/dL  Borderline High  150-199 mg/dL  High             200-499 mg/dL  Very High        >500 mg/dL    HDL Reference Ranges  (U.S. Department of Health and Human Services ATP III Classifcations)    Low     <40 mg/dl (major risk factor for CHD)  High    >60 mg/dl ('negative' risk factor for CHD)        LDL Reference Ranges  (U.S. Department of Health and Human Services ATP III Classifcations)    Optimal          <100 mg/dL  Near Optimal     100-129 mg/dL  Borderline High  130-159 mg/dL  High             160-189 mg/dL  Very High        >189 mg/dL    C-reactive Protein [[id number]]  (Abnormal) Collected:  03/03/20 0441    Specimen:  Blood Updated:  03/03/20 0552     C-Reactive Protein  27.13 mg/dL     CBC & Differential [[id number]] Collected:  03/03/20 0441    Specimen:  Blood Updated:  03/03/20 0522    Narrative:       The following orders were created for panel order CBC & Differential.  Procedure                               Abnormality         Status                     ---------                               -----------         ------                     CBC Auto Differential[[id number]]        Abnormal            Final result                 Please view results for these tests on the individual orders.    CBC Auto Differential [[id number]]  (Abnormal) Collected:  03/03/20 0441    Specimen:  Blood Updated:  03/03/20 0522     WBC 14.70 10*3/mm3      RBC 3.16 10*6/mm3      Hemoglobin 9.7 g/dL      Hematocrit 28.7 %      MCV 90.8 fL      MCH 30.6 pg      MCHC 33.6 g/dL      RDW 13.1 %      RDW-SD 42.0 fl      MPV 8.7 fL      Platelets 274 10*3/mm3      Neutrophil % 85.9 %      Lymphocyte % 7.4 %      Monocyte % 5.4 %      Eosinophil % 1.1 %      Basophil % 0.2 %      Neutrophils, Absolute 12.60 10*3/mm3      Lymphocytes, Absolute 1.10 10*3/mm3      Monocytes, Absolute 0.80 10*3/mm3      Eosinophils, Absolute 0.20 10*3/mm3      Basophils, Absolute 0.00 10*3/mm3      nRBC 0.0 /100 WBC     Hemoglobin A1c [[id number]] Collected:  03/03/20 0441    Specimen:  Blood Updated:  03/03/20 0513    Vitamin B12 [[id number]] Collected:  03/03/20 0441    Specimen:  Blood Updated:  03/03/20 0513    HCG, B-subunit, Quantitative [[id number]] Collected:  03/01/20 1303    Specimen:  Blood Updated:  03/03/20 0335     HCG Quantitative <1 mIU/mL      Comment:                      Female (Non-pregnant)    0 -     5                              (Postmenopausal)  0 -     8                       Female (Pregnant)                       Weeks of Gestation                               3                6 -    71                               4               10 -   750                               2 920 - 4103                                6              158 - 83601                               7             [id number]                               8            06943 -704460                               9            36813 480060                              10            18825 -221094                              12            86511 -217135                              14            62501 - 44106                              15            44482 - 79741                              16             1155 - 86459                              17             9450 - 22479                              18             2597 - 61840  [facility] ECLIA methodology       Narrative:       Performed at:   - [facility]  [id number]  : Josh Beasley PhD, Phone:  [phone]    Blood Culture - Blood, Arm, Left [[id number]] Collected:  02/29/20 1710    Specimen:  Blood from Arm, Left Updated:  03/02/20 1730     Blood Culture No growth at 2 days    Blood Culture - Blood, Arm, Right [[id number]] Collected:  02/29/20 1710    Specimen:  Blood from Arm, Right Updated:  03/02/20 1715     Blood Culture No growth at 2 days    Urinalysis With Culture If Indicated - Urine, Clean Catch [[id number]]  (Abnormal) Collected:  03/02/20 1126    Specimen:  Urine, Clean Catch Updated:  03/02/20 1205     Color, UA Yellow     Appearance, UA Cloudy     Comment: Result checked         pH, UA 6.0     Specific Gravity, UA 1.026     Glucose, UA Negative     Ketones, UA Negative     Bilirubin, UA Negative     Blood, UA Negative     Protein, UA 30 mg/dL (1+)     Leuk Esterase, UA Small (1+)     Nitrite, UA Negative     Urobilinogen, UA 1.0 E.U./dL    Urinalysis, Microscopic Only - Urine, Clean Catch [[id number]]  (Abnormal) Collected:  03/02/20 1126    Specimen:  Urine, Clean Catch Updated:  03/02/20 1205     RBC, UA 6-12 /HPF      WBC, UA 21-30 /HPF      Bacteria, UA Trace /HPF      Squamous Epithelial Cells, UA 7-12 /HPF       Hyaline Casts, UA 3-6 /LPF      Methodology Automated Microscopy        Imaging Results (Last 24 Hours)     ** No results found for the last 24 hours. **           I have reviewed the above results and noted them below    Medication Review:    Current Facility-Administered Medications:   •  acetaminophen (TYLENOL) tablet 650 mg, 650 mg, Oral, Q6H PRN, Aruna Khan FNP, 650 mg at 03/03/20 0923  •  ketorolac (TORADOL) injection 15 mg, 15 mg, Intravenous, Q6H PRN, Aruna Khan FNP, 15 mg at 03/03/20 0514  •  ketorolac (TORADOL) injection 30 mg, 30 mg, Intravenous, Once, Dav Lou MD  •  nicotine (NICODERM CQ) 21 MG/24HR patch 1 patch, 1 patch, Transdermal, Q24H, Aruna Khan FNP  •  ondansetron (ZOFRAN) injection 4 mg, 4 mg, Intravenous, Q6H PRN, Aruna Khan FNP  •  pantoprazole (PROTONIX) injection 40 mg, 40 mg, Intravenous, Q12H, Dav Lou MD, 40 mg at 03/03/20 0923  •  Pharmacy to Dose Zosyn, , Does not apply, Continuous PRN, Dav Lou MD  •  piperacillin-tazobactam (ZOSYN) IVPB 3.375 g in 100 mL NS (CD), 3.375 g, Intravenous, Q8H, Dav Lou MD, Stopped at 03/03/20 0915  •  [COMPLETED] Insert peripheral IV, , , Once **AND** sodium chloride 0.9 % flush 10 mL, 10 mL, Intravenous, PRN, Skyler Murray MD  •  sodium chloride 0.9 % flush 10 mL, 10 mL, Intravenous, Q12H, Aruna Khan FNP, 10 mL at 03/03/20 0929  •  sodium chloride 0.9 % flush 10 mL, 10 mL, Intravenous, PRN, Aruna Khan FNP  •  sodium chloride 0.9 % infusion, 100 mL/hr, Intravenous, Continuous, Aruna Khan FNP, Last Rate: 100 mL/hr at 03/03/20 0915, 100 mL/hr at 03/03/20 0915  •  traMADol (ULTRAM) tablet 50 mg, 50 mg, Oral, Q6H PRN, Deon Crisostomo MD, 50 mg at 03/03/20 0923    Assessment/Plan     Active Problems:    Abdominal pain    Sepsis without acute organ dysfunction (CMS/HCC)    Ileus    Substance abuse (CMS/HCC)    Leukocytosis    Laboratory values  today returned, demonstrates a decreasing white blood cell count on abx  Patient had urine sample sent for GC and chlamydia.  Returned positive for gonorrhea  The patient has PID  Patient is okay to eat from a surgical standpoint  Management of PID per OB/GYN  Patient does not need to follow-up with me  Will sign off, call with questions    Javon Hammond MD  03/03/20  11:16 AM

## 2020-03-03 NOTE — PROGRESS NOTES
LOS: 1 day   Patient Care Team:  Asia Covarrubias MD as PCP - General    Chief Complaint:  Abdominal pain    Subjective    Pt states abdominal pain still about the same as it has been. No BM yet. Flatus present. CLD diet, tolerating. Denies much improvement     Interval History:       History taken from: patient and chart    Review of Systems:    All systems were reviewed and negative except for:  Gastrointestinal: positive for  abdominal pain.    Objective     Vital Signs  Vitals:    03/02/20 2005 03/02/20 2345 03/03/20 0255 03/03/20 0931   BP: 126/79 110/70 110/72 132/86   BP Location: Left arm Left arm Left arm    Patient Position: Lying Lying Lying Sitting   Pulse: 104 99 104 106   Resp: 18 16 16 18   Temp: 97.6 °F (36.4 °C) 98.4 °F (36.9 °C) 99.1 °F (37.3 °C) 98.5 °F (36.9 °C)   TempSrc: Axillary Oral Oral Oral   SpO2: 99%  95% 99%   Weight:       Height:           Physical Exam:     General Appearance:    Alert, cooperative, in no acute distress   Lungs:     Clear to auscultation,respirations regular, even and                   unlabored    Heart:    Regular rhythm and normal rate.   Breast Exam:    Deferred   Abdomen:     Normal bowel sounds, no masses, no organomegaly, soft        non-tender, non-distended, no guarding, no rebound                 Tenderness, +BS in all quads   Genitalia:    Deferred   Extremities:   Moves all extremities well, no edema, no cyanosis, no             redness        Labs:  Lab Results (last 48 hours)     Procedure Component Value Units Date/Time    Urinalysis With Culture If Indicated - Urine, Clean Catch [[id number]]  (Abnormal) Collected:  03/02/20 1126    Specimen:  Urine, Clean Catch Updated:  03/02/20 1205     Color, UA Yellow     Appearance, UA Cloudy     Comment: Result checked         pH, UA 6.0     Specific Gravity, UA 1.026     Glucose, UA Negative     Ketones, UA Negative     Bilirubin, UA Negative     Blood, UA Negative     Protein, UA 30 mg/dL (1+)     Leuk  Esterase, UA Small (1+)     Nitrite, UA Negative     Urobilinogen, UA 1.0 E.U./dL    Urinalysis, Microscopic Only - Urine, Clean Catch [[id number]]  (Abnormal) Collected:  03/02/20 1126    Specimen:  Urine, Clean Catch Updated:  03/02/20 1205     RBC, UA 6-12 /HPF      WBC, UA 21-30 /HPF      Bacteria, UA Trace /HPF      Squamous Epithelial Cells, UA 7-12 /HPF      Hyaline Casts, UA 3-6 /LPF      Methodology Automated Microscopy    Urine Culture - Urine, Urine, Clean Catch [[id number]] Collected:  03/02/20 1126    Specimen:  Urine, Clean Catch Updated:  03/02/20 1205    CBC & Differential [[id number]] Collected:  03/02/20 0747    Specimen:  Blood Updated:  03/02/20 0924    Narrative:       The following orders were created for panel order CBC & Differential.  Procedure                               Abnormality         Status                     ---------                               -----------         ------                     Manual Differential[[id number]]          Abnormal            Final result               CBC Auto Differential[[id number]]        Abnormal            Final result                 Please view results for these tests on the individual orders.    Manual Differential [[id number]]  (Abnormal) Collected:  03/02/20 0747    Specimen:  Blood Updated:  03/02/20 0924     Neutrophil % 81.0 %      Lymphocyte % 6.0 %      Monocyte % 4.0 %      Bands %  9.0 %      Neutrophils Absolute 15.21 10*3/mm3      Lymphocytes Absolute 1.01 10*3/mm3      Monocytes Absolute 0.68 10*3/mm3      RBC Morphology Normal     WBC Morphology Normal     Platelet Morphology Normal    Comprehensive Metabolic Panel [[id number]]  (Abnormal) Collected:  03/02/20 0747    Specimen:  Blood Updated:  03/02/20 0912     Glucose 89 mg/dL      BUN 8 mg/dL      Creatinine 0.61 mg/dL      Sodium 135 mmol/L      Potassium 3.8 mmol/L      Chloride 101 mmol/L      CO2 22.0 mmol/L      Calcium 8.1 mg/dL      Total Protein 6.0 g/dL      Albumin 2.70 g/dL       ALT (SGPT) 8 U/L      AST (SGOT) 11 U/L      Alkaline Phosphatase 98 U/L      Total Bilirubin 0.3 mg/dL      eGFR  African Amer 149 mL/min/1.73      Globulin 3.3 gm/dL      A/G Ratio 0.8 g/dL      BUN/Creatinine Ratio 13.1     Anion Gap 12.0 mmol/L     Narrative:       GFR Normal >60  Chronic Kidney Disease <60  Kidney Failure <15      CBC Auto Differential [[id number]]  (Abnormal) Collected:  03/02/20 0747    Specimen:  Blood Updated:  03/02/20 0902     WBC 16.90 10*3/mm3      RBC 3.19 10*6/mm3      Hemoglobin 9.6 g/dL      Hematocrit 29.7 %      MCV 93.0 fL      MCH 30.1 pg      MCHC 32.3 g/dL      RDW 12.9 %      RDW-SD 42.4 fl      MPV 8.5 fL      Platelets 238 10*3/mm3     Influenza Antigen, Rapid - Swab, Nasopharynx [[id number]]  (Normal) Collected:  03/01/20 1655    Specimen:  Swab from Nasopharynx Updated:  03/01/20 1733     Influenza A PCR Not Detected     Influenza B PCR Not Detected    Blood Culture - Blood, Arm, Left [[id number]] Collected:  02/29/20 1710    Specimen:  Blood from Arm, Left Updated:  03/01/20 1730     Blood Culture No growth at 24 hours    Blood Culture - Blood, Arm, Right [[id number]] Collected:  02/29/20 1710    Specimen:  Blood from Arm, Right Updated:  03/01/20 1715     Blood Culture No growth at 24 hours    Chlamydia trachomatis, Neisseria gonorrhoeae, PCR w/ confirmation - Swab, Vagina [[id number]] Collected:  03/01/20 1615    Specimen:  Swab from Vagina Updated:  03/01/20 1637    HCG, B-subunit, Quantitative [[id number]] Collected:  03/01/20 1303    Specimen:  Blood Updated:  03/01/20 1328    Hepatic Function Panel [[id number]]  (Abnormal) Collected:  03/01/20 0530    Specimen:  Blood Updated:  03/01/20 1151     Total Protein 6.5 g/dL      Albumin 3.00 g/dL      ALT (SGPT) 10 U/L      AST (SGOT) 11 U/L      Alkaline Phosphatase 77 U/L      Total Bilirubin 0.3 mg/dL      Bilirubin, Direct <0.2 mg/dL      Bilirubin, Indirect --     Comment: Unable to calculate       Lipase [[id number]]   (Abnormal) Collected:  03/01/20 0530    Specimen:  Blood Updated:  03/01/20 1151     Lipase 6 U/L     Basic Metabolic Panel [[id number]]  (Abnormal) Collected:  03/01/20 0530    Specimen:  Blood Updated:  03/01/20 0626     Glucose 98 mg/dL      BUN 12 mg/dL      Creatinine 0.74 mg/dL      Sodium 132 mmol/L      Potassium 3.6 mmol/L      Chloride 100 mmol/L      CO2 21.0 mmol/L      Calcium 8.9 mg/dL      eGFR  African Amer 119 mL/min/1.73      BUN/Creatinine Ratio 16.2     Anion Gap 11.0 mmol/L     Narrative:       GFR Normal >60  Chronic Kidney Disease <60  Kidney Failure <15      CBC Auto Differential [[id number]]  (Abnormal) Collected:  03/01/20 0530    Specimen:  Blood Updated:  03/01/20 0607     WBC 20.90 10*3/mm3      RBC 3.64 10*6/mm3      Hemoglobin 11.0 g/dL      Comment: Result checked         Hematocrit 33.7 %      MCV 92.5 fL      MCH 30.2 pg      MCHC 32.7 g/dL      RDW 12.9 %      RDW-SD 42.4 fl      MPV 8.2 fL      Platelets 265 10*3/mm3      Neutrophil % 90.7 %      Lymphocyte % 5.5 %      Monocyte % 3.2 %      Eosinophil % 0.5 %      Basophil % 0.1 %      Neutrophils, Absolute 18.90 10*3/mm3      Lymphocytes, Absolute 1.10 10*3/mm3      Monocytes, Absolute 0.70 10*3/mm3      Eosinophils, Absolute 0.10 10*3/mm3      Basophils, Absolute 0.00 10*3/mm3      nRBC 0.0 /100 WBC     Lactic Acid, Plasma [[id number]]  (Normal) Collected:  03/01/20 0530    Specimen:  Blood Updated:  03/01/20 0604     Lactate 1.0 mmol/L     Urine Drug Screen - Urine, Clean Catch [[id number]]  (Abnormal) Collected:  02/29/20 2027    Specimen:  Urine, Clean Catch Updated:  02/29/20 2103     Amphet/Methamphet, Screen Positive     Barbiturates Screen, Urine Negative     Benzodiazepine Screen, Urine Negative     Cocaine Screen, Urine Negative     Opiate Screen Positive     THC, Screen, Urine Positive     Methadone Screen, Urine Negative     Oxycodone Screen, Urine Negative    Narrative:       Negative Thresholds For Drugs Screened:      Amphetamines               500 ng/ml   Barbiturates               200 ng/ml   Benzodiazepines            100 ng/ml   Cocaine                    300 ng/ml   Methadone                  300 ng/ml   Opiates                    300 ng/ml   Oxycodone                  100 ng/ml   THC                        50 ng/ml    The Normal Value for all drugs tested is negative. This report includes final unconfirmed screening results to be used for medical treatment purposes only. Unconfirmed results must not be used for non-medical purposes such as employment or legal testing. Clinical consideration should be applied to any drug of abuse test, particulary when unconfirmed results are used.  All urine drugs of abuse requests without chain of custody are for medical screening purposes only.  False positives are possible.      hCG, Serum, Qualitative [[id number]]  (Normal) Collected:  02/29/20 2013    Specimen:  Blood Updated:  02/29/20 2040     HCG Qualitative Negative    POC Lactate [[id number]]  (Normal) Collected:  02/29/20 1714    Specimen:  Blood Updated:  02/29/20 1715     Lactate 0.9 mmol/L      Comment: Serial Number: [id number]:  [id number]       CBC & Differential [[id number]] Collected:  02/29/20 1629    Specimen:  Blood Updated:  02/29/20 1704    Narrative:       The following orders were created for panel order CBC & Differential.  Procedure                               Abnormality         Status                     ---------                               -----------         ------                     CBC Auto Differential[[id number]]        Abnormal            Final result                 Please view results for these tests on the individual orders.    CBC Auto Differential [[id number]]  (Abnormal) Collected:  02/29/20 1629    Specimen:  Blood Updated:  02/29/20 1704     WBC 28.90 10*3/mm3      RBC 4.34 10*6/mm3      Hemoglobin 13.4 g/dL      Hematocrit 40.0 %      MCV 92.2 fL      MCH 30.9 pg      MCHC 33.5 g/dL       RDW 13.1 %      RDW-SD 42.4 fl      MPV 8.1 fL      Platelets 333 10*3/mm3     Scan Slide [[id number]] Collected:  02/29/20 1629    Specimen:  Blood Updated:  02/29/20 1704     Scan Slide --     Comment: See Manual Differential Results       Manual Differential [[id number]]  (Abnormal) Collected:  02/29/20 1629    Specimen:  Blood Updated:  02/29/20 1704     Neutrophil % 65.0 %      Lymphocyte % 3.0 %      Monocyte % 2.0 %      Bands %  26.0 %      Metamyelocyte % 3.0 %      Myelocyte % 1.0 %      Neutrophils Absolute 26.30 10*3/mm3      Lymphocytes Absolute 0.87 10*3/mm3      Monocytes Absolute 0.58 10*3/mm3      RBC Morphology Normal     Toxic Granulation Slight/1+     Vacuolated Neutrophils Slight/1+     Giant Platelets Slight/1+    Basic Metabolic Panel [[id number]]  (Abnormal) Collected:  02/29/20 1629    Specimen:  Blood Updated:  02/29/20 1701     Glucose 114 mg/dL      BUN 10 mg/dL      Creatinine 0.79 mg/dL      Sodium 134 mmol/L      Potassium 4.0 mmol/L      Chloride 96 mmol/L      CO2 23.0 mmol/L      Calcium 9.7 mg/dL      eGFR  African Amer 110 mL/min/1.73      BUN/Creatinine Ratio 12.7     Anion Gap 15.0 mmol/L     Narrative:       GFR Normal >60  Chronic Kidney Disease <60  Kidney Failure <15            Radiology:  Imaging Results (Last 48 Hours)     Procedure Component Value Units Date/Time    CT Abdomen Pelvis With Contrast [[id number]] Collected:  03/01/20 1353     Updated:  03/01/20 1402    Narrative:       CT ABDOMEN PELVIS W CONTRAST-     Date of Exam: 3/1/2020 1:31 PM     Indication: Abdominal pain, unspecified; R10.9-Unspecified abdominal  pain; D72.829-Elevated white blood cell count, unspecified; K56.7-Ileus,  unspecified.    Patient's a 24-year-old female who presents with hypogastric pain for 3  days with nausea, for today's study 100 mL's of Isovue-370 were infused.     Comparison: Study of 03/03/2017, report was made available from [facility]  dated 02/29/2020 which described mild change of ileus  with fluid-filled  loops of small bowel.     Technique: Contiguous axial CT images were obtained from the lung bases  to the superior iliac crests without contrast.  Following uneventful  administration of 100 intravenous and oral contrast, contiguous axial  images obtained from lung bases to the pubic symphysis. Sagittal and  coronal reconstructions were performed.  Automated exposure control and  iterative reconstruction methods were used.     FINDINGS:  Today's study demonstrates interval change from prior CT report there is  been development of free fluid largest collection in the pelvis measures  over 5.6 x 3 cm on image 139 there remain several loops of mildly  distended loop of small bowel with air-fluid levels that are new from  the study of 2017 and at were reported in CT report from yesterday there  is mild small bowel wall thickening in the mid small bowel best  demonstrated on and around axial image 94 were the small bowel wall  measures up to 6 mm consistent with enteritis most likely due to  infection, inflammation or ischemia would be unlikely in this age group.  There is normal enhancement of the great vessels of the abdomen and  pelvis no mesenteric thrombus is seen in either mesenteric arteries or  veins. No gas in the bowel wall is identified. The liver, spleen,  pancreas, gallbladder, adrenal glands and both kidneys appear  unremarkable no retroperitoneal lymphadenopathy is seen there is a trace  amount of ascites in both right and left gutters. The lung windows  demonstrate mild bilateral basilar atelectasis.       Impression:          1. Interval change from yesterday's CT report, outside images are not  available, interval worsening  2. Changes are consistent with ileus with change of enteritis with small  bowel wall focal thickening in several mid small bowel loops-new from  yesterday's exam  3. Development of abdominal and pelvic ascites, mild in degree-new from  yesterday's report  4. Lung  bases demonstrate mild atelectasis, new from prior study  5. no evidence of free air, adenopathy or mass or vascular abnormality     Electronically Signed By-Sumeet Mott Jr. On:3/1/2020 2:00 PM  This report was finalized on [id number] by  Sumeet Mott Jr., .    XR Abdomen KUB [[id number]] Collected:  03/01/20 1220     Updated:  03/01/20 1223    Narrative:       DATE OF EXAM:  3/1/2020 11:51 AM     PROCEDURE:  XR ABDOMEN KUB-     INDICATIONS:  severe abd pain; R10.9-Unspecified abdominal pain; D72.829-Elevated  white blood cell count, unspecified; K56.7-Ileus, unspecified     24-year-old female with severe lower abdominal pain, CT scan performed  [facility]day at [facility] demonstrated change of ileus     COMPARISON:  No Comparisons Available     TECHNIQUE:   Single radiographic view of the abdomen was obtained.     FINDINGS:  Today's KUB demonstrates a nonspecific bowel pattern there is no  evidence of free air. The bony structures of the abdomen and pelvis  appear unremarkable.        Impression:          1. Nonspecific bowel pattern  2. No evidence of free air     Electronically Signed By-Sumeet Mott Jr. On:3/1/2020 12:21 PM  This report was finalized on [id number] by  Sumeet Mott Jr., .    XR Chest 1 View [[id number]] Collected:  03/01/20 1219     Updated:  03/01/20 1222    Narrative:       DATE OF EXAM:  3/1/2020 11:58 AM     PROCEDURE:  XR CHEST 1 VW-     INDICATIONS:  cp; R10.9-Unspecified abdominal pain; D72.829-Elevated white blood cell  count, unspecified; K56.7-Ileus, unspecified  Patient's a 24-year-old female with chest pain, positive smoking  history, substance abuse     COMPARISON:  Study of 05/27/2017     TECHNIQUE:   Single radiographic AP view of the chest was obtained.     FINDINGS:  Today's portable erect study was obtained on 03/01/2020 11:54 AM     The heart size is normal the lung fields are clear. The diaphragmatic  surfaces are smooth. The bony structures are intact. The upper abdomen  is  unremarkable. There is no active cardiopulmonary disease.        Impression:          1. No active cardiopulmonary disease  2. No interval change from 05/27/2017     Electronically Signed By-Sumeet Mott Jr. On:3/1/2020 12:20 PM  This report was finalized on [id number] by  Sumeet Mott Jr., .            Assessment/Plan       Abdominal pain    Sepsis without acute organ dysfunction (CMS/HCC)    Ileus    Substance abuse (CMS/HCC)    Leukocytosis      Still awaiting culture results for GC/CT. Pt being managed by hospitalist. Recommend continuing present management.    Yamile Vallejo NP  03/03/20  10:40 AM

## 2020-03-03 NOTE — PLAN OF CARE
Problem: Patient Care Overview  Goal: Plan of Care Review  Outcome: Ongoing (interventions implemented as appropriate)  Flowsheets (Taken 3/3/2020 1116)  Progress: improving  Plan of Care Reviewed With: patient  Outcome Summary: pt white count improving. pain controlled with oral medication.

## 2020-03-03 NOTE — PAYOR COMM NOTE
"  This is clinical for Yinka Nath, pd auth# [id number]    Pt was an ER admit in observation on 2/29, symptoms persisted,  changed to inpt on 3/2/20 at 2359.    DX- ILEUS- enteritis  / LEUKOCYTOSIS      [facility] inpt- Gastroenterology >Ileus (M-200)  · Admission is indicated by ALL of the following(1)(2)(3)(4)(5):  ? Signs and symptoms of ileus (eg, abdominal pain, distention, vomiting) that are severe (Hypotension, electrolyte abnormality, obstipation), or persistent (eg, NG tube placed and will need to be continued, IV hydration support required)  ? Imaging study consistent with ileus (ie, alternative diagnosis not more likely)  NPO -      ===========================     Wbc  28.9--20.98    Temp  101.2    Tachycardic-  continuous > 123--112--  POS-  drug screening     Yinka Nath (24 y.o. Female)     Date of Birth Social Security Number Address Home Phone MRN    1995  [id number] [phone] [id number]    Hindu Marital Status          None Single       Admission Date Admission Type Admitting Provider Attending Provider Department, Room/Bed    2/29/20 Emergency Carmine, MD Kranthi Mireles Suresh, MD [facility] MOTHER BABY, M413/1    Discharge Date Discharge Disposition Discharge Destination                       Attending Provider:  Deon Crisostomo MD    Allergies:  No Known Allergies    Isolation:  None   Infection:  None   Code Status:  CPR    Ht:  157.5 cm (62\")   Wt:  83.6 kg (184 lb 6.4 oz)    Admission Cmt:  None   Principal Problem:  None                Active Insurance as of 2/29/2020     Primary Coverage     Payor Plan Insurance Group Employer/Plan Group    [place]      Payor Plan Address Payor Plan Phone Number Payor Plan Fax Number Effective Dates    [address]   2/29/2020 - None Entered    [place] [address]       Subscriber Name Subscriber Birth Date Member ID       YINKA NATH 1995 " [id number]                 Emergency Contacts      (Rel.) Home Phone Work Phone Mobile Phone    Miriam Rodríguez (Mother) [phone] -- --               Physician Progress Notes (last 24 hours) (Notes from 20 through 20)      Javon Hammond MD at 20 1822          GENERAL SURGERY PROGRESS NOTE    3/2/2020   LOS: 0 days   Patient Care Team:  Asia Covarrubias MD as PCP - General    Chief Complaint: abdominal pain    Subjective   HPI: Patient is a 24 y.o. female presents with lower abdominal pain, and failure to have a bowel movement or passed flatus over the last 3 days.  The patient reports that she was seen at [place] [facility] earlier this week where she was told she was pregnant.  She then went to [facility] approximately 3 days ago again, where she was told she was pregnant and instructed to take prenatal vitamins.  Prior to presenting to these 2 hospitals she was engaging in unprotected sex with multiple partners.  She reports that 3 days ago she started to develop lower abdominal pain bilaterally which radiated up into her epigastrium and chest.  The pain was not associated with nausea, vomiting, diarrhea, but was associated with constipation.  Yesterday, her pain became so severe that she presented to [facility] where she was seen and evaluated in the emergency room.  There, her labs were notable for a negative beta hCG, elevated white blood cell count of 21,000, a CT scan of the abdomen and pelvis was performed that demonstrated ileus without other intra-abdominal findings, and a pelvic ultrasound which did not demonstrate any intrauterine pregnancy or findings of pelvic inflammatory disease.  The patient's past medical history is significant for methamphetamine use, tobacco abuse, marijuana use, and 1 live birth with 3 miscarriages.  The patient's 1 live birth was a  performed in May 2018.  She is  otherwise had no intra-abdominal surgeries.  The patient presented to our emergency room yesterday, and was admitted to the hospital for further work-up and evaluation.    Interval History:   Reports feeling better with abx and abd binder. Having chills and sweats. Mild nausea without emesis    Objective     Vital Signs  Temp:  [98.7 °F (37.1 °C)-101.2 °F (38.4 °C)] 99.9 °F (37.7 °C)  Heart Rate:  [102-120] 108  Resp:  [16-22] 22  BP: ()/(63-78) 118/78    Physical Exam   Constitutional: She is oriented to person, place, and time. She appears well-developed and well-nourished.   HENT:   Head: Normocephalic and atraumatic.   Eyes: Pupils are equal, round, and reactive to light. EOM are normal.   Neck: Normal range of motion. Neck supple.   Cardiovascular: Normal rate and regular rhythm.   Pulmonary/Chest: Effort normal and breath sounds normal.   Abdominal: Soft.    to palpation bilateral lower quadrants, no evidence of peritonitis.   Musculoskeletal: Normal range of motion. She exhibits edema.   Neurological: She is alert and oriented to person, place, and time.   Skin: Skin is warm and dry. No erythema.   Psychiatric: She has a normal mood and affect. Her behavior is normal.   Vitals reviewed.       Results Review:    Lab Results (last 24 hours)     Procedure Component Value Units Date/Time    Blood Culture - Blood, Arm, Left [[id number]] Collected:  02/29/20 1710    Specimen:  Blood from Arm, Left Updated:  03/02/20 1730     Blood Culture No growth at 2 days    Blood Culture - Blood, Arm, Right [[id number]] Collected:  02/29/20 1710    Specimen:  Blood from Arm, Right Updated:  03/02/20 1715     Blood Culture No growth at 2 days    Urinalysis With Culture If Indicated - Urine, Clean Catch [[id number]]  (Abnormal) Collected:  03/02/20 1126    Specimen:  Urine, Clean Catch Updated:  03/02/20 1205     Color, UA Yellow     Appearance, UA Cloudy     Comment: Result checked         pH, UA 6.0     Specific  Gravity, UA 1.026     Glucose, UA Negative     Ketones, UA Negative     Bilirubin, UA Negative     Blood, UA Negative     Protein, UA 30 mg/dL (1+)     Leuk Esterase, UA Small (1+)     Nitrite, UA Negative     Urobilinogen, UA 1.0 E.U./dL    Urinalysis, Microscopic Only - Urine, Clean Catch [[id number]]  (Abnormal) Collected:  03/02/20 1126    Specimen:  Urine, Clean Catch Updated:  03/02/20 1205     RBC, UA 6-12 /HPF      WBC, UA 21-30 /HPF      Bacteria, UA Trace /HPF      Squamous Epithelial Cells, UA 7-12 /HPF      Hyaline Casts, UA 3-6 /LPF      Methodology Automated Microscopy    Urine Culture - Urine, Urine, Clean Catch [[id number]] Collected:  03/02/20 1126    Specimen:  Urine, Clean Catch Updated:  03/02/20 1205    CBC & Differential [[id number]] Collected:  03/02/20 0747    Specimen:  Blood Updated:  03/02/20 0924    Narrative:       The following orders were created for panel order CBC & Differential.  Procedure                               Abnormality         Status                     ---------                               -----------         ------                     Manual Differential[[id number]]          Abnormal            Final result               CBC Auto Differential[[id number]]        Abnormal            Final result                 Please view results for these tests on the individual orders.    Manual Differential [[id number]]  (Abnormal) Collected:  03/02/20 0747    Specimen:  Blood Updated:  03/02/20 0924     Neutrophil % 81.0 %      Lymphocyte % 6.0 %      Monocyte % 4.0 %      Bands %  9.0 %      Neutrophils Absolute 15.21 10*3/mm3      Lymphocytes Absolute 1.01 10*3/mm3      Monocytes Absolute 0.68 10*3/mm3      RBC Morphology Normal     WBC Morphology Normal     Platelet Morphology Normal    Comprehensive Metabolic Panel [[id number]]  (Abnormal) Collected:  03/02/20 0747    Specimen:  Blood Updated:  03/02/20 0912     Glucose 89 mg/dL      BUN 8 mg/dL      Creatinine 0.61 mg/dL      Sodium  135 mmol/L      Potassium 3.8 mmol/L      Chloride 101 mmol/L      CO2 22.0 mmol/L      Calcium 8.1 mg/dL      Total Protein 6.0 g/dL      Albumin 2.70 g/dL      ALT (SGPT) 8 U/L      AST (SGOT) 11 U/L      Alkaline Phosphatase 98 U/L      Total Bilirubin 0.3 mg/dL      eGFR  African Amer 149 mL/min/1.73      Globulin 3.3 gm/dL      A/G Ratio 0.8 g/dL      BUN/Creatinine Ratio 13.1     Anion Gap 12.0 mmol/L     Narrative:       GFR Normal >60  Chronic Kidney Disease <60  Kidney Failure <15      CBC Auto Differential [[id number]]  (Abnormal) Collected:  03/02/20 0747    Specimen:  Blood Updated:  03/02/20 0902     WBC 16.90 10*3/mm3      RBC 3.19 10*6/mm3      Hemoglobin 9.6 g/dL      Hematocrit 29.7 %      MCV 93.0 fL      MCH 30.1 pg      MCHC 32.3 g/dL      RDW 12.9 %      RDW-SD 42.4 fl      MPV 8.5 fL      Platelets 238 10*3/mm3         Imaging Results (Last 24 Hours)     ** No results found for the last 24 hours. **           I have reviewed the above results and noted them below    Medication Review:    Current Facility-Administered Medications:   •  acetaminophen (TYLENOL) tablet 650 mg, 650 mg, Oral, Q6H PRN, Aruna Khan FNP  •  HYDROcodone-acetaminophen (NORCO) 7.5-325 MG per tablet 1 tablet, 1 tablet, Oral, Q6H PRN, Aruna Khan FNP, 1 tablet at 03/02/20 1229  •  ketorolac (TORADOL) injection 15 mg, 15 mg, Intravenous, Q6H PRN, Aruna Khan FNP, 15 mg at 03/02/20 1613  •  ketorolac (TORADOL) injection 30 mg, 30 mg, Intravenous, Once, Dav Lou MD  •  nicotine (NICODERM CQ) 21 MG/24HR patch 1 patch, 1 patch, Transdermal, Q24H, Aruna Khan FNP  •  ondansetron (ZOFRAN) injection 4 mg, 4 mg, Intravenous, Q6H PRN, Khan, Aruna, FNP  •  pantoprazole (PROTONIX) injection 40 mg, 40 mg, Intravenous, Q12H, Dav Lou MD, 40 mg at 03/02/20 0949  •  Pharmacy to Dose Zosyn, , Does not apply, Continuous PRN, Dav Lou MD  •  piperacillin-tazobactam  (ZOSYN) IVPB 3.375 g in 100 mL NS (CD), 3.375 g, Intravenous, Q8H, Dav Lou MD, Last Rate: 25 mL/hr at 03/02/20 1229, 3.375 g at 03/02/20 1229  •  [COMPLETED] Insert peripheral IV, , , Once **AND** sodium chloride 0.9 % flush 10 mL, 10 mL, Intravenous, PRN, Skyler Murray MD  •  sodium chloride 0.9 % flush 10 mL, 10 mL, Intravenous, Q12H, Aruna Khan, SHELLIEP, 10 mL at 03/01/20 2041  •  sodium chloride 0.9 % flush 10 mL, 10 mL, Intravenous, PRN, Aruna Khan, FNP  •  sodium chloride 0.9 % infusion, 100 mL/hr, Intravenous, Continuous, Aruna Khan, FNP, Last Rate: 100 mL/hr at 03/02/20 1008, 100 mL/hr at 03/02/20 1008    Assessment/Plan     Active Problems:    Abdominal pain    Sepsis without acute organ dysfunction (CMS/HCC)    Ileus    Substance abuse (CMS/HCC)    Leukocytosis    Patient with lower abdominal pain right greater than left of unclear etiology.  At the outside hospital, the patient had a CT scan which per report clearly identified the appendix and was normal.  However, repeat CT scan performed here demonstrated significant amount of fluid in the pelvis along with inflamed loops of small bowel in the right lower quadrant concerning for enteritis.  The appendix was difficult to visualize.  Regardless, white blood cell count is decreasing on antibiotics and the patient is clinically improving.  I discussed with the patient that if this is appendicitis, my preference would be to treat her with antibiotics until her symptoms resolve, and if she is still having lower abdominal pain in approximately 2 months then we could proceed with an interval appendectomy at that time.  I am still awaiting the results of her GC and Chlamydia testing.  PID is still high my differential for the etiology of her abdominal pain given her symptoms, her urinalysis which demonstrated significant pyuria without nitrates, and history of unprotected sex with multiple partners.  Continue treatment with  antibiotics  Okay for diet from surgical standpoint  We will continue to follow.    Javon Hammond MD  03/02/20  6:22 PM        Electronically signed by Javon Hammond MD at 03/02/20 4749     Deon Crisostomo MD at 03/02/20 1718                [facility] Medicine Services Daily Progress Note      Hospitalist Team  LOS 0 days      Patient Care Team:  Asia Covarrubias MD as PCP - General    Patient Location: [facility] [place]      Subjective   Subjective     Chief Complaint / Subjective  Chief Complaint   Patient presents with   • Abdominal Pain         Brief Synopsis of Hospital Course/HPI    24-year-old female presents the ER with chief complaint of severe bilateral lower quadrant abdominal pain which started 3 days ago.  Patient reports associated subjective fever and chills.  She was seen at [facility] ER for CT of abdomen showing mild ileus treated and released.  Today the abdominal pain came back and the patient decided to come in for further evaluation.  He patient is noted to be mildly tachycardic at time of interview.  Patient denies personal or family history of IBS or IBD.  The patient also reports bilateral lower extremity pain and twitching.the patient reports she last used methamphetamines 3 days ago.  She states she quit using methamphetamines because she was just quitting.     Urine drug screen positive for methamphetamine, opiates and THC.  The patient admits to doing methamphetamine and marijuana over the last 5 to 7 years.  She denies opioid use.  She reports she has never done IV drugs not even once in the past.     There was some confusion and the patient was showing up under pregnancy diagnosis.  Review of records from [facility] show that the patient's urine hCG was negative.  Urine qualitative blood sample taken for pregnancy which was negative at this facility.     Review of records from prior facility show probable ileus.   "          Date::    3./1  Severe abdominal pain.  Patient crying  Severe tenderness on exam  Discussed with Dr. Hammond regarding further work-up plans CT abdomen pelvis is recommended.  Keep n.p.o.  Pain medications addressed  Nursing staff concerned about patient previously told she had positive pregnancy test.  Repeat hCG ordered.  Consult GI and OB/GYN  Check lipase and LFT    3/2/2020: Patient seen and examined and patient seemed to doing fairly well and denies any new complaints.  Patient continues to use pain medications.  When I have seen the patient she was very comfortable but does complain of some abdominal pain.  We are repeating some labs tomorrow.  I have reviewed general surgery as well as OB/GYN notes.    ROS    All other systems reviewed and neg      Objective   Objective      Vital Signs  Temp:  [98.7 °F (37.1 °C)-101.2 °F (38.4 °C)] 99.9 °F (37.7 °C)  Heart Rate:  [102-120] 106  Resp:  [16-22] 22  BP: ()/(63-78) 118/78  Oxygen Therapy  SpO2: 92 %  Pulse Oximetry Type: Intermittent  Device (Oxygen Therapy): room air  Flowsheet Rows      First Filed Value   Admission Height  157.5 cm (62\") Documented at 02/29/2020 1453   Admission Weight  77.1 kg (170 lb) Documented at 02/29/2020 1453        Intake & Output (last 3 days)       02/28 0701 - 02/29 0700 02/29 0701 - 03/01 0700 03/01 0701 - 03/02 0700 03/02 0701 - 03/03 0700    P.O.   325     I.V. (mL/kg)   2985 (35.7)     IV Piggyback   200     Total Intake(mL/kg)   3510 (42)     Urine (mL/kg/hr)  100 600 (0.3) 850 (1)    Total Output  100 600 850    Net  -100 +2910 -850            Urine Unmeasured Occurrence   2 x         Lines, Drains & Airways    Active LDAs     Name:   Placement date:   Placement time:   Site:   Days:    Peripheral IV 02/29/20 1629 Right Antecubital   02/29/20    1629    Antecubital   less than 1                  Physical Exam:    Physical Exam   Constitutional: She is oriented to person, place, and time.   Crying in pain "   HENT:   Head: Normocephalic and atraumatic.   Eyes: Conjunctivae and lids are normal.   Neck: Trachea normal. No thyroid mass present.   Cardiovascular: Normal heart sounds.   Pulmonary/Chest: She has rhonchi in the right middle field, the right lower field, the left middle field and the left lower field.   Abdominal:   Diffuse severe tenderness   Genitourinary:   Genitourinary Comments: Deferred   Neurological: She is alert and oriented to person, place, and time. She has normal strength. No cranial nerve deficit.             Procedures:              Results Review:     I reviewed the patient's new clinical results.      Lab Results (last 24 hours)     Procedure Component Value Units Date/Time    Blood Culture - Blood, Arm, Right [[id number]] Collected:  02/29/20 1710    Specimen:  Blood from Arm, Right Updated:  03/02/20 1715     Blood Culture No growth at 2 days    Urinalysis With Culture If Indicated - Urine, Clean Catch [[id number]]  (Abnormal) Collected:  03/02/20 1126    Specimen:  Urine, Clean Catch Updated:  03/02/20 1205     Color, UA Yellow     Appearance, UA Cloudy     Comment: Result checked         pH, UA 6.0     Specific Gravity, UA 1.026     Glucose, UA Negative     Ketones, UA Negative     Bilirubin, UA Negative     Blood, UA Negative     Protein, UA 30 mg/dL (1+)     Leuk Esterase, UA Small (1+)     Nitrite, UA Negative     Urobilinogen, UA 1.0 E.U./dL    Urinalysis, Microscopic Only - Urine, Clean Catch [[id number]]  (Abnormal) Collected:  03/02/20 1126    Specimen:  Urine, Clean Catch Updated:  03/02/20 1205     RBC, UA 6-12 /HPF      WBC, UA 21-30 /HPF      Bacteria, UA Trace /HPF      Squamous Epithelial Cells, UA 7-12 /HPF      Hyaline Casts, UA 3-6 /LPF      Methodology Automated Microscopy    Urine Culture - Urine, Urine, Clean Catch [[id number]] Collected:  03/02/20 1126    Specimen:  Urine, Clean Catch Updated:  03/02/20 1205    CBC & Differential [[id number]] Collected:  03/02/20 0747     Specimen:  Blood Updated:  03/02/20 0924    Narrative:       The following orders were created for panel order CBC & Differential.  Procedure                               Abnormality         Status                     ---------                               -----------         ------                     Manual Differential[[id number]]          Abnormal            Final result               CBC Auto Differential[[id number]]        Abnormal            Final result                 Please view results for these tests on the individual orders.    Manual Differential [[id number]]  (Abnormal) Collected:  03/02/20 0747    Specimen:  Blood Updated:  03/02/20 0924     Neutrophil % 81.0 %      Lymphocyte % 6.0 %      Monocyte % 4.0 %      Bands %  9.0 %      Neutrophils Absolute 15.21 10*3/mm3      Lymphocytes Absolute 1.01 10*3/mm3      Monocytes Absolute 0.68 10*3/mm3      RBC Morphology Normal     WBC Morphology Normal     Platelet Morphology Normal    Comprehensive Metabolic Panel [[id number]]  (Abnormal) Collected:  03/02/20 0747    Specimen:  Blood Updated:  03/02/20 0912     Glucose 89 mg/dL      BUN 8 mg/dL      Creatinine 0.61 mg/dL      Sodium 135 mmol/L      Potassium 3.8 mmol/L      Chloride 101 mmol/L      CO2 22.0 mmol/L      Calcium 8.1 mg/dL      Total Protein 6.0 g/dL      Albumin 2.70 g/dL      ALT (SGPT) 8 U/L      AST (SGOT) 11 U/L      Alkaline Phosphatase 98 U/L      Total Bilirubin 0.3 mg/dL      eGFR  African Amer 149 mL/min/1.73      Globulin 3.3 gm/dL      A/G Ratio 0.8 g/dL      BUN/Creatinine Ratio 13.1     Anion Gap 12.0 mmol/L     Narrative:       GFR Normal >60  Chronic Kidney Disease <60  Kidney Failure <15      CBC Auto Differential [[id number]]  (Abnormal) Collected:  03/02/20 0747    Specimen:  Blood Updated:  03/02/20 0902     WBC 16.90 10*3/mm3      RBC 3.19 10*6/mm3      Hemoglobin 9.6 g/dL      Hematocrit 29.7 %      MCV 93.0 fL      MCH 30.1 pg      MCHC 32.3 g/dL      RDW 12.9 %      RDW-SD  42.4 fl      MPV 8.5 fL      Platelets 238 10*3/mm3     Influenza Antigen, Rapid - Swab, Nasopharynx [[id number]]  (Normal) Collected:  03/01/20 1655    Specimen:  Swab from Nasopharynx Updated:  03/01/20 1733     Influenza A PCR Not Detected     Influenza B PCR Not Detected    Blood Culture - Blood, Arm, Left [[id number]] Collected:  02/29/20 1710    Specimen:  Blood from Arm, Left Updated:  03/01/20 1730     Blood Culture No growth at 24 hours        No results found for: HGBA1C            Lab Results   Component Value Date    LIPASE 6 (L) 03/01/2020     Lab Results   Component Value Date    CHOL 147 11/10/2017    TRIG 93 11/10/2017    HDL 38 (L) 11/10/2017     (H) 11/10/2017       No results found for: INTRAOP, PREDX, FINALDX, COMDX    Microbiology Results (last 10 days)     Procedure Component Value - Date/Time    Influenza Antigen, Rapid - Swab, Nasopharynx [[id number]]  (Normal) Collected:  03/01/20 1655    Lab Status:  Final result Specimen:  Swab from Nasopharynx Updated:  03/01/20 1733     Influenza A PCR Not Detected     Influenza B PCR Not Detected    Blood Culture - Blood, Arm, Left [[id number]] Collected:  02/29/20 1710    Lab Status:  Preliminary result Specimen:  Blood from Arm, Left Updated:  03/01/20 1730     Blood Culture No growth at 24 hours    Blood Culture - Blood, Arm, Right [[id number]] Collected:  02/29/20 1710    Lab Status:  Preliminary result Specimen:  Blood from Arm, Right Updated:  03/02/20 1715     Blood Culture No growth at 2 days          ECG/EMG Results (most recent)     Procedure Component Value Units Date/Time    ECG 12 Lead [[id number]] Collected:  03/01/20 0729     Updated:  03/01/20 0731    Narrative:       HEART RATE= 93  bpm  RR Interval= 648  ms  WA Interval= 136  ms  P Horizontal Axis= -6  deg  P Front Axis= 83  deg  QRSD Interval= 77  ms  QT Interval= 349  ms  QRS Axis= 56  deg  T Wave Axis= 23  deg  - NORMAL ECG -  Sinus rhythm  Electronically Signed By:   Date and Time  of Study: 2020-03-01 07:29:55                    Ct Abdomen Pelvis With Contrast    Result Date: 3/1/2020   1. Interval change from yesterday's CT report, outside images are not available, interval worsening 2. Changes are consistent with ileus with change of enteritis with small bowel wall focal thickening in several mid small bowel loops-new from yesterday's exam 3. Development of abdominal and pelvic ascites, mild in degree-new from yesterday's report 4. Lung bases demonstrate mild atelectasis, new from prior study 5. no evidence of free air, adenopathy or mass or vascular abnormality  Electronically Signed By-Sumeet Mott Jr. On:3/1/2020 2:00 PM This report was finalized on [id number] by  Sumeet Mott Jr., .    Xr Chest 1 View    Result Date: 3/1/2020   1. No active cardiopulmonary disease 2. No interval change from 05/27/2017  Electronically Signed By-Sumeet Mott Jr. On:3/1/2020 12:20 PM This report was finalized on [id number] by  Sumeet Mott Jr., .    Xr Abdomen Kub    Result Date: 3/1/2020   1. Nonspecific bowel pattern 2. No evidence of free air  Electronically Signed By-Sumeet Mott Jr. On:3/1/2020 12:21 PM This report was finalized on [id number] by  Sumeet Mott Jr., .          Xrays, labs reviewed personally by physician.    Medication Review:   I have reviewed the patient's current medication list      Scheduled Meds    ketorolac 30 mg Intravenous Once   nicotine 1 patch Transdermal Q24H   pantoprazole 40 mg Intravenous Q12H   piperacillin-tazobactam 3.375 g Intravenous Q8H   sodium chloride 10 mL Intravenous Q12H       Meds Infusions    Pharmacy to Dose Zosyn     sodium chloride 100 mL/hr Last Rate: 100 mL/hr (03/02/20 1008)       Meds PRN  •  acetaminophen  •  HYDROcodone-acetaminophen  •  ketorolac  •  ondansetron  •  Pharmacy to Dose Zosyn  •  [COMPLETED] Insert peripheral IV **AND** sodium chloride  •  sodium chloride    I personally reviewed patient's x-ray film and all other relevant  data                    sAssessment/Plan        Assessment/Plan     Active Hospital Problems    Diagnosis  POA   • Ileus [K56.7]  Yes     Priority: High   • Abdominal pain [R10.9]  Yes     Priority: High   • Sepsis without acute organ dysfunction (CMS/HCC) [A41.9]  Yes     Priority: High   • Substance abuse (CMS/HCC) [F19.10]  Yes     Priority: Medium   • Leukocytosis [D72.829]  Yes     Priority: Medium      Resolved Hospital Problems   No resolved problems to display.       MEDICAL DECISION MAKING COMPLEXITY BY PROBLEM:       Acute abdominal pain with suspected intra-abdominal infection/PID/enteritis and patient has high white count.  Patient will be given broad-spectrum IV antibiotics Zosyn and patient has been seen by general surgery as well as OB/GYN.  We will continue Toradol/I will change narcotics to p.o. tramadol.  Patient will be given IV fluids and I am repeating labs tomorrow morning.        Substance abuse, tobacco, methamphetamine, marijuana: Encourage smoking cessation, methamphetamine cessation, marijuana cessation; add nicotine patch        VTE Prophylaxis -SCD  Hold off on Lovenox      GI prophylaxis with Protonix    Mechanical Order History:     None      Pharmalogical Order History:     Ordered     Dose Route Frequency Stop    02/29/20 1940  enoxaparin (LOVENOX) syringe 40 mg      40 mg SC Every 24 Hours Scheduled --            Code Status -   Code Status and Medical Interventions:   Ordered at: 02/29/20 1940     Code Status:    CPR     Medical Interventions (Level of Support Prior to Arrest):    Full       Discharge Planning          Destination      Coordination has not been started for this encounter.      Durable Medical Equipment      Coordination has not been started for this encounter.      Dialysis/Infusion      Coordination has not been started for this encounter.      Home Medical Care      Coordination has not been started for this encounter.      Therapy      Coordination has not been  started for this encounter.      Community Resources      Coordination has not been started for this encounter.            Electronically signed by Deon Crsiostomo MD, 03/02/20, 5:17 PM.  [facility] Hospitalist Team        Electronically signed by Deon Crisostomo MD at 03/02/20 9406     Rima Nuñez APRN at 03/02/20 1428               LOS: 0 days   Patient Care Team:  Asia Covarrubias MD as PCP - General    Chief Complaint:  Abdominal pain    Subjective    Pt states abdominal pain same as yesterday. Denies any improvement. Reports some urinary incontinence. Denies issues with bowel movements.    Interval History:       History taken from: patient and chart    Review of Systems:    All systems were reviewed and negative except for:  Gastrointestinal: positive for  abdominal pain. Urinary incontinence.    Objective     Vital Signs  Vitals:    03/02/20 0214 03/02/20 0437 03/02/20 0715 03/02/20 1238   BP: 99/63  103/67 117/74   BP Location: Left arm  Left arm    Patient Position: Lying  Sitting    Pulse: 102  112 106   Resp: 16 18 20 18   Temp: 98.7 °F (37.1 °C)  100 °F (37.8 °C) 98.8 °F (37.1 °C)   TempSrc: Oral  Oral Axillary   SpO2: 97%  97% 96%   Weight:       Height:           Physical Exam:     General Appearance:    Alert, cooperative, in no acute distress   Lungs:     Clear to auscultation,respirations regular, even and                   unlabored    Heart:    Regular rhythm and normal rate.   Breast Exam:    Deferred   Abdomen:     Normal bowel sounds, no masses, no organomegaly, soft        non-tender, non-distended, no guarding, no rebound                 tenderness   Genitalia:    Deferred   Extremities:   Moves all extremities well, no edema, no cyanosis, no             redness        Labs:  Lab Results (last 48 hours)     Procedure Component Value Units Date/Time    Urinalysis With Culture If Indicated - Urine, Clean Catch [[id number]]  (Abnormal) Collected:  03/02/20 1126    Specimen:  Urine,  Clean Catch Updated:  03/02/20 1205     Color, UA Yellow     Appearance, UA Cloudy     Comment: Result checked         pH, UA 6.0     Specific Gravity, UA 1.026     Glucose, UA Negative     Ketones, UA Negative     Bilirubin, UA Negative     Blood, UA Negative     Protein, UA 30 mg/dL (1+)     Leuk Esterase, UA Small (1+)     Nitrite, UA Negative     Urobilinogen, UA 1.0 E.U./dL    Urinalysis, Microscopic Only - Urine, Clean Catch [[id number]]  (Abnormal) Collected:  03/02/20 1126    Specimen:  Urine, Clean Catch Updated:  03/02/20 1205     RBC, UA 6-12 /HPF      WBC, UA 21-30 /HPF      Bacteria, UA Trace /HPF      Squamous Epithelial Cells, UA 7-12 /HPF      Hyaline Casts, UA 3-6 /LPF      Methodology Automated Microscopy    Urine Culture - Urine, Urine, Clean Catch [[id number]] Collected:  03/02/20 1126    Specimen:  Urine, Clean Catch Updated:  03/02/20 1205    CBC & Differential [[id number]] Collected:  03/02/20 0747    Specimen:  Blood Updated:  03/02/20 0924    Narrative:       The following orders were created for panel order CBC & Differential.  Procedure                               Abnormality         Status                     ---------                               -----------         ------                     Manual Differential[[id number]]          Abnormal            Final result               CBC Auto Differential[[id number]]        Abnormal            Final result                 Please view results for these tests on the individual orders.    Manual Differential [[id number]]  (Abnormal) Collected:  03/02/20 0747    Specimen:  Blood Updated:  03/02/20 0924     Neutrophil % 81.0 %      Lymphocyte % 6.0 %      Monocyte % 4.0 %      Bands %  9.0 %      Neutrophils Absolute 15.21 10*3/mm3      Lymphocytes Absolute 1.01 10*3/mm3      Monocytes Absolute 0.68 10*3/mm3      RBC Morphology Normal     WBC Morphology Normal     Platelet Morphology Normal    Comprehensive Metabolic Panel [[id number]]  (Abnormal)  Collected:  03/02/20 0747    Specimen:  Blood Updated:  03/02/20 0912     Glucose 89 mg/dL      BUN 8 mg/dL      Creatinine 0.61 mg/dL      Sodium 135 mmol/L      Potassium 3.8 mmol/L      Chloride 101 mmol/L      CO2 22.0 mmol/L      Calcium 8.1 mg/dL      Total Protein 6.0 g/dL      Albumin 2.70 g/dL      ALT (SGPT) 8 U/L      AST (SGOT) 11 U/L      Alkaline Phosphatase 98 U/L      Total Bilirubin 0.3 mg/dL      eGFR  African Amer 149 mL/min/1.73      Globulin 3.3 gm/dL      A/G Ratio 0.8 g/dL      BUN/Creatinine Ratio 13.1     Anion Gap 12.0 mmol/L     Narrative:       GFR Normal >60  Chronic Kidney Disease <60  Kidney Failure <15      CBC Auto Differential [[id number]]  (Abnormal) Collected:  03/02/20 0747    Specimen:  Blood Updated:  03/02/20 0902     WBC 16.90 10*3/mm3      RBC 3.19 10*6/mm3      Hemoglobin 9.6 g/dL      Hematocrit 29.7 %      MCV 93.0 fL      MCH 30.1 pg      MCHC 32.3 g/dL      RDW 12.9 %      RDW-SD 42.4 fl      MPV 8.5 fL      Platelets 238 10*3/mm3     Influenza Antigen, Rapid - Swab, Nasopharynx [[id number]]  (Normal) Collected:  03/01/20 1655    Specimen:  Swab from Nasopharynx Updated:  03/01/20 1733     Influenza A PCR Not Detected     Influenza B PCR Not Detected    Blood Culture - Blood, Arm, Left [[id number]] Collected:  02/29/20 1710    Specimen:  Blood from Arm, Left Updated:  03/01/20 1730     Blood Culture No growth at 24 hours    Blood Culture - Blood, Arm, Right [[id number]] Collected:  02/29/20 1710    Specimen:  Blood from Arm, Right Updated:  03/01/20 1715     Blood Culture No growth at 24 hours    Chlamydia trachomatis, Neisseria gonorrhoeae, PCR w/ confirmation - Swab, Vagina [[id number]] Collected:  03/01/20 1615    Specimen:  Swab from Vagina Updated:  03/01/20 1637    HCG, B-subunit, Quantitative [[id number]] Collected:  03/01/20 1303    Specimen:  Blood Updated:  03/01/20 1328    Hepatic Function Panel [[id number]]  (Abnormal) Collected:  03/01/20 0530    Specimen:  Blood  Updated:  03/01/20 1151     Total Protein 6.5 g/dL      Albumin 3.00 g/dL      ALT (SGPT) 10 U/L      AST (SGOT) 11 U/L      Alkaline Phosphatase 77 U/L      Total Bilirubin 0.3 mg/dL      Bilirubin, Direct <0.2 mg/dL      Bilirubin, Indirect --     Comment: Unable to calculate       Lipase [[id number]]  (Abnormal) Collected:  03/01/20 0530    Specimen:  Blood Updated:  03/01/20 1151     Lipase 6 U/L     Basic Metabolic Panel [[id number]]  (Abnormal) Collected:  03/01/20 0530    Specimen:  Blood Updated:  03/01/20 0626     Glucose 98 mg/dL      BUN 12 mg/dL      Creatinine 0.74 mg/dL      Sodium 132 mmol/L      Potassium 3.6 mmol/L      Chloride 100 mmol/L      CO2 21.0 mmol/L      Calcium 8.9 mg/dL      eGFR  African Amer 119 mL/min/1.73      BUN/Creatinine Ratio 16.2     Anion Gap 11.0 mmol/L     Narrative:       GFR Normal >60  Chronic Kidney Disease <60  Kidney Failure <15      CBC Auto Differential [[id number]]  (Abnormal) Collected:  03/01/20 0530    Specimen:  Blood Updated:  03/01/20 0607     WBC 20.90 10*3/mm3      RBC 3.64 10*6/mm3      Hemoglobin 11.0 g/dL      Comment: Result checked         Hematocrit 33.7 %      MCV 92.5 fL      MCH 30.2 pg      MCHC 32.7 g/dL      RDW 12.9 %      RDW-SD 42.4 fl      MPV 8.2 fL      Platelets 265 10*3/mm3      Neutrophil % 90.7 %      Lymphocyte % 5.5 %      Monocyte % 3.2 %      Eosinophil % 0.5 %      Basophil % 0.1 %      Neutrophils, Absolute 18.90 10*3/mm3      Lymphocytes, Absolute 1.10 10*3/mm3      Monocytes, Absolute 0.70 10*3/mm3      Eosinophils, Absolute 0.10 10*3/mm3      Basophils, Absolute 0.00 10*3/mm3      nRBC 0.0 /100 WBC     Lactic Acid, Plasma [[id number]]  (Normal) Collected:  03/01/20 0530    Specimen:  Blood Updated:  03/01/20 0604     Lactate 1.0 mmol/L     Urine Drug Screen - Urine, Clean Catch [[id number]]  (Abnormal) Collected:  02/29/20 2027    Specimen:  Urine, Clean Catch Updated:  02/29/20 2103     Amphet/Methamphet, Screen Positive      Barbiturates Screen, Urine Negative     Benzodiazepine Screen, Urine Negative     Cocaine Screen, Urine Negative     Opiate Screen Positive     THC, Screen, Urine Positive     Methadone Screen, Urine Negative     Oxycodone Screen, Urine Negative    Narrative:       Negative Thresholds For Drugs Screened:     Amphetamines               500 ng/ml   Barbiturates               200 ng/ml   Benzodiazepines            100 ng/ml   Cocaine                    300 ng/ml   Methadone                  300 ng/ml   Opiates                    300 ng/ml   Oxycodone                  100 ng/ml   THC                        50 ng/ml    The Normal Value for all drugs tested is negative. This report includes final unconfirmed screening results to be used for medical treatment purposes only. Unconfirmed results must not be used for non-medical purposes such as employment or legal testing. Clinical consideration should be applied to any drug of abuse test, particulary when unconfirmed results are used.  All urine drugs of abuse requests without chain of custody are for medical screening purposes only.  False positives are possible.      hCG, Serum, Qualitative [[id number]]  (Normal) Collected:  02/29/20 2013    Specimen:  Blood Updated:  02/29/20 2040     HCG Qualitative Negative    POC Lactate [[id number]]  (Normal) Collected:  02/29/20 1714    Specimen:  Blood Updated:  02/29/20 1715     Lactate 0.9 mmol/L      Comment: Serial Number: [id number]:  [id number]       CBC & Differential [[id number]] Collected:  02/29/20 1629    Specimen:  Blood Updated:  02/29/20 1704    Narrative:       The following orders were created for panel order CBC & Differential.  Procedure                               Abnormality         Status                     ---------                               -----------         ------                     CBC Auto Differential[[id number]]        Abnormal            Final result                 Please view results for  these tests on the individual orders.    CBC Auto Differential [[id number]]  (Abnormal) Collected:  02/29/20 1629    Specimen:  Blood Updated:  02/29/20 1704     WBC 28.90 10*3/mm3      RBC 4.34 10*6/mm3      Hemoglobin 13.4 g/dL      Hematocrit 40.0 %      MCV 92.2 fL      MCH 30.9 pg      MCHC 33.5 g/dL      RDW 13.1 %      RDW-SD 42.4 fl      MPV 8.1 fL      Platelets 333 10*3/mm3     Scan Slide [[id number]] Collected:  02/29/20 1629    Specimen:  Blood Updated:  02/29/20 1704     Scan Slide --     Comment: See Manual Differential Results       Manual Differential [[id number]]  (Abnormal) Collected:  02/29/20 1629    Specimen:  Blood Updated:  02/29/20 1704     Neutrophil % 65.0 %      Lymphocyte % 3.0 %      Monocyte % 2.0 %      Bands %  26.0 %      Metamyelocyte % 3.0 %      Myelocyte % 1.0 %      Neutrophils Absolute 26.30 10*3/mm3      Lymphocytes Absolute 0.87 10*3/mm3      Monocytes Absolute 0.58 10*3/mm3      RBC Morphology Normal     Toxic Granulation Slight/1+     Vacuolated Neutrophils Slight/1+     Giant Platelets Slight/1+    Basic Metabolic Panel [[id number]]  (Abnormal) Collected:  02/29/20 1629    Specimen:  Blood Updated:  02/29/20 1701     Glucose 114 mg/dL      BUN 10 mg/dL      Creatinine 0.79 mg/dL      Sodium 134 mmol/L      Potassium 4.0 mmol/L      Chloride 96 mmol/L      CO2 23.0 mmol/L      Calcium 9.7 mg/dL      eGFR  African Amer 110 mL/min/1.73      BUN/Creatinine Ratio 12.7     Anion Gap 15.0 mmol/L     Narrative:       GFR Normal >60  Chronic Kidney Disease <60  Kidney Failure <15            Radiology:  Imaging Results (Last 48 Hours)     Procedure Component Value Units Date/Time    CT Abdomen Pelvis With Contrast [[id number]] Collected:  03/01/20 1353     Updated:  03/01/20 1402    Narrative:       CT ABDOMEN PELVIS W CONTRAST-     Date of Exam: 3/1/2020 1:31 PM     Indication: Abdominal pain, unspecified; R10.9-Unspecified abdominal  pain; D72.829-Elevated white blood cell count,  unspecified; K56.7-Ileus,  unspecified.    Patient's a 24-year-old female who presents with hypogastric pain for 3  days with nausea, for today's study 100 mL's of Isovue-370 were infused.     Comparison: Study of 03/03/2017, report was made available from [facility]  dated 02/29/2020 which described mild change of ileus with fluid-filled  loops of small bowel.     Technique: Contiguous axial CT images were obtained from the lung bases  to the superior iliac crests without contrast.  Following uneventful  administration of 100 intravenous and oral contrast, contiguous axial  images obtained from lung bases to the pubic symphysis. Sagittal and  coronal reconstructions were performed.  Automated exposure control and  iterative reconstruction methods were used.     FINDINGS:  Today's study demonstrates interval change from prior CT report there is  been development of free fluid largest collection in the pelvis measures  over 5.6 x 3 cm on image 139 there remain several loops of mildly  distended loop of small bowel with air-fluid levels that are new from  the study of 2017 and at were reported in CT report from yesterday there  is mild small bowel wall thickening in the mid small bowel best  demonstrated on and around axial image 94 were the small bowel wall  measures up to 6 mm consistent with enteritis most likely due to  infection, inflammation or ischemia would be unlikely in this age group.  There is normal enhancement of the great vessels of the abdomen and  pelvis no mesenteric thrombus is seen in either mesenteric arteries or  veins. No gas in the bowel wall is identified. The liver, spleen,  pancreas, gallbladder, adrenal glands and both kidneys appear  unremarkable no retroperitoneal lymphadenopathy is seen there is a trace  amount of ascites in both right and left gutters. The lung windows  demonstrate mild bilateral basilar atelectasis.       Impression:          1. Interval change from yesterday's CT report,  outside images are not  available, interval worsening  2. Changes are consistent with ileus with change of enteritis with small  bowel wall focal thickening in several mid small bowel loops-new from  yesterday's exam  3. Development of abdominal and pelvic ascites, mild in degree-new from  yesterday's report  4. Lung bases demonstrate mild atelectasis, new from prior study  5. no evidence of free air, adenopathy or mass or vascular abnormality     Electronically Signed By-Sumeet Mott Jr. On:3/1/2020 2:00 PM  This report was finalized on [id number] by  Sumeet Mott Jr., .    XR Abdomen KUB [[id number]] Collected:  03/01/20 1220     Updated:  03/01/20 1223    Narrative:       DATE OF EXAM:  3/1/2020 11:51 AM     PROCEDURE:  XR ABDOMEN KUB-     INDICATIONS:  severe abd pain; R10.9-Unspecified abdominal pain; D72.829-Elevated  white blood cell count, unspecified; K56.7-Ileus, unspecified     24-year-old female with severe lower abdominal pain, CT scan performed  yesterday at [facility] demonstrated change of ileus     COMPARISON:  No Comparisons Available     TECHNIQUE:   Single radiographic view of the abdomen was obtained.     FINDINGS:  Today's KUB demonstrates a nonspecific bowel pattern there is no  evidence of free air. The bony structures of the abdomen and pelvis  appear unremarkable.        Impression:          1. Nonspecific bowel pattern  2. No evidence of free air     Electronically Signed By-Sumeet Mott Jr. On:3/1/2020 12:21 PM  This report was finalized on [id number] by  Sumeet Mott Jr., .    XR Chest 1 View [[id number]] Collected:  03/01/20 1219     Updated:  03/01/20 1222    Narrative:       DATE OF EXAM:  3/1/2020 11:58 AM     PROCEDURE:  XR CHEST 1 VW-     INDICATIONS:  cp; R10.9-Unspecified abdominal pain; D72.829-Elevated white blood cell  count, unspecified; K56.7-Ileus, unspecified  Patient's a 24-year-old female with chest pain, positive smoking  history, substance abuse     COMPARISON:  Study of  05/27/2017     TECHNIQUE:   Single radiographic AP view of the chest was obtained.     FINDINGS:  Today's portable erect study was obtained on 03/01/2020 11:54 AM     The heart size is normal the lung fields are clear. The diaphragmatic  surfaces are smooth. The bony structures are intact. The upper abdomen  is unremarkable. There is no active cardiopulmonary disease.        Impression:          1. No active cardiopulmonary disease  2. No interval change from 05/27/2017     Electronically Signed By-Sumeet Mott Jr. On:3/1/2020 12:20 PM  This report was finalized on [id number] by  Sumeet Mott Jr., .            Assessment/Plan       Abdominal pain    Sepsis without acute organ dysfunction (CMS/HCC)    Ileus    Substance abuse (CMS/HCC)    Leukocytosis      Awaiting culture results for GC/CT. Pt being managed by hospitalist. Recommend continuing present management.    JOHNIE Queen  03/02/20  2:29 PM        Electronically signed by Rima Nuñez APRN at 03/02/20 1435       Consult Notes (last 24 hours) (Notes from 03/02/20 0913 through 03/03/20 0913)    No notes of this type exist for this encounter.

## 2020-03-04 ENCOUNTER — READMISSION MANAGEMENT (OUTPATIENT)
Dept: CALL CENTER | Facility: HOSPITAL | Age: 25
End: 2020-03-04

## 2020-03-04 NOTE — DISCHARGE SUMMARY
[facility] Medicine Services  DISCHARGE SUMMARY        Prepared For PCP:  Asia Covarrubias MD    Patient Name: Stephanie Nath  : 1995  MRN: [id number]      Date of Admission:   2020    Date of Discharge:  3/3/2020    Length of stay:  LOS: 1 day     Hospital Course     Presenting Problem:   Ileus (CMS/HCC) [K56.7]  Abdominal pain, unspecified abdominal location [R10.9]  Leukocytosis, unspecified type [D72.829]  Abdominal pain, unspecified abdominal location [R10.9]      Active Hospital Problems    Diagnosis  POA   • PID (acute pelvic inflammatory disease) [N73.0]  Yes     Priority: High     Class: Acute   • Substance abuse (CMS/HCC) [F19.10]  Yes     Priority: Medium      Resolved Hospital Problems    Diagnosis Date Resolved POA   • Ileus [K56.7] 2020 Yes     Priority: High   • Abdominal pain [R10.9] 2020 Yes     Priority: High   • Sepsis without acute organ dysfunction (CMS/HCC) [A41.9] 2020 Yes     Priority: High   • Leukocytosis [D72.829] 2020 Yes     Priority: Medium           Hospital Course:  Stephanie Nath is a 24 y.o. female presented to hospital with abdominal pain as well as high white count and patient was seen by general surgery as well as GYN as patient possibly had enteritis versus PID.  Patient had GYN exam done by specialist and she found to have positive gonorrhea.  Patient was given IV antibiotic and patient clinically improved.  Patient at this time has done fairly well will be discharged home on p.o. Omnicef/p.o. doxycycline/p.o. Flagyl for 14 days of therapy and she will follow-up with GYN as well as primary care provider as an outpatient.  Patient has been explained she is doing fairly well her abdominal exam is normal and she has been given Toradol for the pain control as an outpatient.  Patient discharged in stable condition.          Recommendation for Outpatient Providers:             Reasons For Change In Medications and  Indications for New Medications:        Day of Discharge     HPI:       Vital Signs:   Temp:  [97.6 °F (36.4 °C)-99.1 °F (37.3 °C)] 98.5 °F (36.9 °C)  Heart Rate:  [] 85  Resp:  [16-18] 17  BP: (110-132)/(70-86) 118/75     Physical Exam:  Physical Exam   Cardiovascular: Normal rate and regular rhythm.   Pulmonary/Chest: Effort normal and breath sounds normal.   Abdominal: Soft. Bowel sounds are normal.   Nursing note and vitals reviewed.      Pertinent  and/or Most Recent Results     Results from last 7 days   Lab Units 03/03/20 0441 03/02/20  0747 03/01/20  0530 02/29/20  1629   WBC 10*3/mm3 14.70* 16.90* 20.90* 28.90*   HEMOGLOBIN g/dL 9.7* 9.6* 11.0* 13.4   HEMATOCRIT % 28.7* 29.7* 33.7* 40.0   PLATELETS 10*3/mm3 274 238 265 333   SODIUM mmol/L 139 135* 132* 134*   POTASSIUM mmol/L 3.4* 3.8 3.6 4.0   CHLORIDE mmol/L 103 101 100 96*   CO2 mmol/L 24.0 22.0 21.0* 23.0   BUN mg/dL 5* 8 12 10   CREATININE mg/dL 0.60 0.61 0.74 0.79   GLUCOSE mg/dL 96 89 98 114*   CALCIUM mg/dL 7.9* 8.1* 8.9 9.7     Results from last 7 days   Lab Units 03/03/20 0441 03/02/20  0747 03/01/20  0530   BILIRUBIN mg/dL 0.2 0.3 0.3   ALK PHOS U/L 68 98 77   ALT (SGPT) U/L 9 8 10   AST (SGOT) U/L 12 11 11     Results from last 7 days   Lab Units 03/03/20 0441   CHOLESTEROL mg/dL 76   TRIGLYCERIDES mg/dL 66   HDL CHOL mg/dL 20*     Results from last 7 days   Lab Units 03/03/20 0441 03/01/20  0530 02/29/20  1714   TSH uIU/mL 2.380  --   --    HEMOGLOBIN A1C % 4.9  --   --    TROPONIN T ng/mL <0.010  --   --    LACTATE mmol/L  --  1.0 0.9       Brief Urine Lab Results  (Last result in the past 365 days)      Color   Clarity   Blood   Leuk Est   Nitrite   Protein   CREAT   Urine HCG        03/02/20 1126 Yellow Cloudy  Comment:  Result checked  Negative Small (1+) Negative 30 mg/dL (1+)               Microbiology Results Abnormal     Procedure Component Value - Date/Time    Urine Culture - Urine, Urine, Clean Catch [[id number]]  (Normal)  Collected:  03/02/20 1126    Lab Status:  Final result Specimen:  Urine, Clean Catch Updated:  03/03/20 1850     Urine Culture No growth    Blood Culture - Blood, Arm, Left [[id number]] Collected:  03/02/20 1801    Lab Status:  Preliminary result Specimen:  Blood from Arm, Left Updated:  03/03/20 1830     Blood Culture No growth at 24 hours    Blood Culture - Blood, Arm, Right [[id number]] Collected:  03/02/20 1758    Lab Status:  Preliminary result Specimen:  Blood from Arm, Right Updated:  03/03/20 1830     Blood Culture No growth at 24 hours    Blood Culture - Blood, Arm, Left [[id number]] Collected:  02/29/20 1710    Lab Status:  Preliminary result Specimen:  Blood from Arm, Left Updated:  03/03/20 1715     Blood Culture No growth at 3 days    Blood Culture - Blood, Arm, Right [[id number]] Collected:  02/29/20 1710    Lab Status:  Preliminary result Specimen:  Blood from Arm, Right Updated:  03/03/20 1715     Blood Culture No growth at 3 days    Chlamydia trachomatis, Neisseria gonorrhoeae, PCR w/ confirmation - Swab, Vagina [[id number]] Collected:  03/01/20 1615    Lab Status:  Final result Specimen:  Swab from Vagina Updated:  03/03/20 1109     Chlamydia trachomatis, PERI --     Comment: No Aptima transport received.        Notified: Nata Cruz 3/3/2020        Neisseria gonorrhoeae, PERI --     Comment: Test not performed       Narrative:       Performed at:  [address] [place] [address]  [id number]  : Shana Kumar MD, Phone:  [phone]    Chlamydia trachomatis, Neisseria gonorrhoeae, PCR - Urine, Urine, Clean Catch [[id number]]  (Abnormal) Collected:  03/02/20 0944    Lab Status:  Final result Specimen:  Urine, Clean Catch Updated:  03/03/20 1101     Chlamydia DNA by PCR Not Detected     Neisseria gonorrhoeae by PCR Detected    Influenza Antigen, Rapid - Swab, Nasopharynx [[id number]]  (Normal) Collected:  03/01/20 1655    Lab Status:  Final result Specimen:  Swab from  Nasopharynx Updated:  03/01/20 1733     Influenza A PCR Not Detected     Influenza B PCR Not Detected          Ct Abdomen Pelvis With Contrast    Result Date: 3/1/2020  Impression:  1. Interval change from yesterday's CT report, outside images are not available, interval worsening 2. Changes are consistent with ileus with change of enteritis with small bowel wall focal thickening in several mid small bowel loops-new from yesterday's exam 3. Development of abdominal and pelvic ascites, mild in degree-new from yesterday's report 4. Lung bases demonstrate mild atelectasis, new from prior study 5. no evidence of free air, adenopathy or mass or vascular abnormality  Electronically Signed By-Sumeet Mott Jr. On:3/1/2020 2:00 PM This report was finalized on [id number] by  Sumeet Mott Jr., .    Xr Chest 1 View    Result Date: 3/1/2020  Impression:  1. No active cardiopulmonary disease 2. No interval change from 05/27/2017  Electronically Signed By-Sumeet Mott Jr. On:3/1/2020 12:20 PM This report was finalized on [id number] by  Sumeet Mott Jr., .    Xr Abdomen Kub    Result Date: 3/1/2020  Impression:  1. Nonspecific bowel pattern 2. No evidence of free air  Electronically Signed By-Sumeet Mott Jr. On:3/1/2020 12:21 PM This report was finalized on [id number] by  Sumeet Mott Jr., .                             Test Results Pending at Discharge   Order Current Status    Blood Culture - Blood, Arm, Left Preliminary result    Blood Culture - Blood, Arm, Left Preliminary result    Blood Culture - Blood, Arm, Right Preliminary result    Blood Culture - Blood, Arm, Right Preliminary result            Procedures Performed           Consults:   Consults     Date and Time Order Name Status Description    3/1/2020 1135 Inpatient Obstetrics / Gynecology Consult      3/1/2020 1131 Inpatient General Surgery Consult Completed     3/1/2020 1128 Inpatient Gastroenterology Consult      2/29/2020 1715 Hospitalist (on-call MD unless  specified) Completed             Discharge Details        Discharge Medications      New Medications      Instructions Start Date   cefdinir 300 MG capsule  Commonly known as:  OMNICEF   300 mg, Oral, 2 Times Daily      doxycycline 50 MG capsule  Commonly known as:  MONODOX   100 mg, Oral, 2 Times Daily      ketorolac 10 MG tablet  Commonly known as:  TORADOL   10 mg, Oral, Every 6 Hours PRN      metroNIDAZOLE 500 MG tablet  Commonly known as:  FLAGYL   500 mg, Oral, 3 Times Daily         Continue These Medications      Instructions Start Date   prazosin 1 MG capsule  Commonly known as:  MINIPRESS   1 mg, Oral, Nightly      sertraline 100 MG tablet  Commonly known as:  ZOLOFT   100 mg, Oral, Daily      traZODone 100 MG tablet  Commonly known as:  DESYREL   50 mg, Oral, Nightly             No Known Allergies      Discharge Disposition:  Home or Self Care    Diet:  Hospital:  Diet Order   Procedures   • Diet Regular         Discharge Activity:         CODE STATUS:    Code Status and Medical Interventions:   Ordered at: 02/29/20 1940     Code Status:    CPR     Medical Interventions (Level of Support Prior to Arrest):    Full         Follow-up Appointments  No future appointments.          Condition on Discharge:      Stable          Electronically signed by Deon Crisostomo MD, 03/03/20, 7:06 PM.    Time: I spent  35  minutes on this discharge activity which included face-to-face encounter with the patient/reviewing the data in the system/coordination of the care with the nursing staff as well as consultants/documentation/entering orders.

## 2020-03-05 ENCOUNTER — READMISSION MANAGEMENT (OUTPATIENT)
Dept: CALL CENTER | Facility: HOSPITAL | Age: 25
End: 2020-03-05

## 2020-03-05 LAB
BACTERIA SPEC AEROBE CULT: NORMAL
BACTERIA SPEC AEROBE CULT: NORMAL

## 2020-03-05 NOTE — OUTREACH NOTE
Medical Week 1 Survey      Responses   [facility] patient discharged from?  [facility]   Does the patient have one of the following disease processes/diagnoses(primary or secondary)?  Other   Is there a successful TCM telephone encounter documented?  No   Week 1 attempt successful?  No   Rescheduled  Revoked   Revoke  Decline to participate [NO ANSWER, NO VM LEFT]          Anjelica Chambers LPN

## 2020-03-05 NOTE — OUTREACH NOTE
Prep Survey      Responses   [facility] facility patient discharged from?  [facility]   Is LACE score < 7 ?  No   Eligibility  Readm Mgmt   Discharge diagnosis  PID (acute pelvic inflammatory disease),     gonorrhea   Does the patient have one of the following disease processes/diagnoses(primary or secondary)?  Other   Does the patient have Home health ordered?  No   Is there a DME ordered?  No   Prep survey completed?  Yes          Jessica Long RN

## 2020-03-07 LAB
BACTERIA SPEC AEROBE CULT: NORMAL
BACTERIA SPEC AEROBE CULT: NORMAL

## 2020-03-08 PROCEDURE — 93010 ELECTROCARDIOGRAM REPORT: CPT | Performed by: INTERNAL MEDICINE

## 2022-03-17 PROCEDURE — U0004 COV-19 TEST NON-CDC HGH THRU: HCPCS
